# Patient Record
Sex: FEMALE | Race: WHITE | Employment: FULL TIME | URBAN - METROPOLITAN AREA
[De-identification: names, ages, dates, MRNs, and addresses within clinical notes are randomized per-mention and may not be internally consistent; named-entity substitution may affect disease eponyms.]

---

## 2017-01-10 ENCOUNTER — LAB REQUISITION (OUTPATIENT)
Dept: LAB | Facility: HOSPITAL | Age: 22
End: 2017-01-10
Payer: COMMERCIAL

## 2017-01-10 DIAGNOSIS — F33.9 RECURRENT MAJOR DEPRESSIVE DISORDER (HCC): ICD-10-CM

## 2017-01-10 LAB
B-HCG UR QL: NEGATIVE
BILIRUB UR QL: NEGATIVE
CLARITY UR: CLEAR
COLOR UR: YELLOW
GLUCOSE UR-MCNC: NEGATIVE MG/DL
HGB UR QL STRIP.AUTO: NEGATIVE
KETONES UR-MCNC: NEGATIVE MG/DL
NITRITE UR QL STRIP.AUTO: NEGATIVE
PH UR: 7 [PH] (ref 5–8)
PROT UR-MCNC: NEGATIVE MG/DL
RBC #/AREA URNS AUTO: <1 /HPF
SP GR UR STRIP: 1.01 (ref 1–1.03)
UROBILINOGEN UR STRIP-ACNC: <2
VIT C UR-MCNC: NEGATIVE MG/DL
WBC #/AREA URNS AUTO: 0 /HPF

## 2017-01-10 PROCEDURE — 81001 URINALYSIS AUTO W/SCOPE: CPT | Performed by: OTHER

## 2017-01-10 PROCEDURE — 81025 URINE PREGNANCY TEST: CPT | Performed by: OTHER

## 2017-01-25 ENCOUNTER — LAB REQUISITION (OUTPATIENT)
Dept: ADMINISTRATIVE | Age: 22
End: 2017-01-25
Payer: COMMERCIAL

## 2017-01-25 DIAGNOSIS — F50.9 EATING DISORDER, UNSPECIFIED: ICD-10-CM

## 2017-01-25 LAB
ALBUMIN SERPL BCP-MCNC: 3.9 G/DL (ref 3.5–4.8)
ALBUMIN/GLOB SERPL: 1.2 {RATIO} (ref 1–2)
ALP SERPL-CCNC: 70 U/L (ref 32–100)
ALT SERPL-CCNC: 18 U/L (ref 14–54)
AMYLASE SERPL-CCNC: 84 U/L (ref 24–108)
ANION GAP SERPL CALC-SCNC: 6 MMOL/L (ref 0–18)
AST SERPL-CCNC: 23 U/L (ref 15–41)
BASOPHILS # BLD: 0.1 K/UL (ref 0–0.2)
BASOPHILS NFR BLD: 1 %
BILIRUB SERPL-MCNC: 0.7 MG/DL (ref 0.3–1.2)
BUN SERPL-MCNC: 11 MG/DL (ref 8–20)
BUN/CREAT SERPL: 12.5 (ref 10–20)
CALCIUM SERPL-MCNC: 9.3 MG/DL (ref 8.5–10.5)
CHLORIDE SERPL-SCNC: 106 MMOL/L (ref 95–110)
CHOLEST SERPL-MCNC: 149 MG/DL (ref 110–200)
CO2 SERPL-SCNC: 28 MMOL/L (ref 22–32)
CREAT SERPL-MCNC: 0.88 MG/DL (ref 0.5–1.5)
EOSINOPHIL # BLD: 0.1 K/UL (ref 0–0.7)
EOSINOPHIL NFR BLD: 2 %
ERYTHROCYTE [DISTWIDTH] IN BLOOD BY AUTOMATED COUNT: 13.8 % (ref 11–15)
GLOBULIN PLAS-MCNC: 3.2 G/DL (ref 2.5–3.7)
GLUCOSE SERPL-MCNC: 98 MG/DL (ref 70–99)
HCT VFR BLD AUTO: 41.9 % (ref 35–48)
HDLC SERPL-MCNC: 72 MG/DL
HGB BLD-MCNC: 13.9 G/DL (ref 12–16)
LDLC SERPL CALC-MCNC: 68 MG/DL (ref 0–99)
LYMPHOCYTES # BLD: 1.9 K/UL (ref 1–4)
LYMPHOCYTES NFR BLD: 31 %
MAGNESIUM SERPL-MCNC: 2.1 MG/DL (ref 1.8–2.5)
MCH RBC QN AUTO: 31.4 PG (ref 27–32)
MCHC RBC AUTO-ENTMCNC: 33.2 G/DL (ref 32–37)
MCV RBC AUTO: 94.7 FL (ref 80–100)
MONOCYTES # BLD: 0.6 K/UL (ref 0–1)
MONOCYTES NFR BLD: 9 %
NEUTROPHILS # BLD AUTO: 3.4 K/UL (ref 1.8–7.7)
NEUTROPHILS NFR BLD: 57 %
NONHDLC SERPL-MCNC: 77 MG/DL
OSMOLALITY UR CALC.SUM OF ELEC: 289 MOSM/KG (ref 275–295)
PHOSPHATE SERPL-MCNC: 4.2 MG/DL (ref 2.4–4.7)
PLATELET # BLD AUTO: 246 K/UL (ref 140–400)
PMV BLD AUTO: 9.3 FL (ref 7.4–10.3)
POTASSIUM SERPL-SCNC: 4.3 MMOL/L (ref 3.3–5.1)
PROT SERPL-MCNC: 7.1 G/DL (ref 5.9–8.4)
RBC # BLD AUTO: 4.42 M/UL (ref 3.7–5.4)
SODIUM SERPL-SCNC: 140 MMOL/L (ref 136–144)
TRIGL SERPL-MCNC: 44 MG/DL (ref 1–149)
TSH SERPL-ACNC: 1.27 UIU/ML (ref 0.34–5.6)
WBC # BLD AUTO: 6.1 K/UL (ref 4–11)

## 2017-01-25 PROCEDURE — 36415 COLL VENOUS BLD VENIPUNCTURE: CPT | Performed by: OTHER

## 2017-01-25 PROCEDURE — 84100 ASSAY OF PHOSPHORUS: CPT | Performed by: OTHER

## 2017-01-25 PROCEDURE — 83735 ASSAY OF MAGNESIUM: CPT | Performed by: OTHER

## 2017-01-25 PROCEDURE — 82150 ASSAY OF AMYLASE: CPT | Performed by: OTHER

## 2017-01-25 PROCEDURE — 80061 LIPID PANEL: CPT | Performed by: OTHER

## 2017-01-25 PROCEDURE — 84443 ASSAY THYROID STIM HORMONE: CPT | Performed by: OTHER

## 2017-01-25 PROCEDURE — 85025 COMPLETE CBC W/AUTO DIFF WBC: CPT | Performed by: OTHER

## 2017-01-25 PROCEDURE — 80053 COMPREHEN METABOLIC PANEL: CPT | Performed by: OTHER

## 2017-01-26 ENCOUNTER — LAB REQUISITION (OUTPATIENT)
Dept: ADMINISTRATIVE | Age: 22
End: 2017-01-26
Payer: COMMERCIAL

## 2017-01-26 DIAGNOSIS — F50.9 EATING DISORDER, UNSPECIFIED: ICD-10-CM

## 2017-01-27 ENCOUNTER — LAB REQUISITION (OUTPATIENT)
Dept: ADMINISTRATIVE | Age: 22
End: 2017-01-27
Payer: COMMERCIAL

## 2017-01-27 DIAGNOSIS — F50.9 EATING DISORDER, UNSPECIFIED: ICD-10-CM

## 2017-01-27 LAB
AMYLASE SERPL-CCNC: 75 U/L (ref 24–108)
ANION GAP SERPL CALC-SCNC: 12 MMOL/L (ref 0–18)
BUN SERPL-MCNC: 10 MG/DL (ref 8–20)
BUN/CREAT SERPL: 12.7 (ref 10–20)
CALCIUM SERPL-MCNC: 9 MG/DL (ref 8.5–10.5)
CHLORIDE SERPL-SCNC: 105 MMOL/L (ref 95–110)
CO2 SERPL-SCNC: 23 MMOL/L (ref 22–32)
CREAT SERPL-MCNC: 0.79 MG/DL (ref 0.5–1.5)
GLUCOSE SERPL-MCNC: 98 MG/DL (ref 70–99)
MAGNESIUM SERPL-MCNC: 2.2 MG/DL (ref 1.8–2.5)
OSMOLALITY UR CALC.SUM OF ELEC: 289 MOSM/KG (ref 275–295)
PHOSPHATE SERPL-MCNC: 4.2 MG/DL (ref 2.4–4.7)
POTASSIUM SERPL-SCNC: 3.9 MMOL/L (ref 3.3–5.1)
SODIUM SERPL-SCNC: 140 MMOL/L (ref 136–144)

## 2017-01-27 PROCEDURE — 80048 BASIC METABOLIC PNL TOTAL CA: CPT | Performed by: OTHER

## 2017-01-27 PROCEDURE — 84100 ASSAY OF PHOSPHORUS: CPT | Performed by: OTHER

## 2017-01-27 PROCEDURE — 82150 ASSAY OF AMYLASE: CPT | Performed by: OTHER

## 2017-01-27 PROCEDURE — 36415 COLL VENOUS BLD VENIPUNCTURE: CPT | Performed by: OTHER

## 2017-01-27 PROCEDURE — 83735 ASSAY OF MAGNESIUM: CPT | Performed by: OTHER

## 2017-01-30 LAB
AMYLASE SERPL-CCNC: 73 U/L (ref 24–108)
ANION GAP SERPL CALC-SCNC: 8 MMOL/L (ref 0–18)
BUN SERPL-MCNC: 7 MG/DL (ref 8–20)
BUN/CREAT SERPL: 8.5 (ref 10–20)
CALCIUM SERPL-MCNC: 8.8 MG/DL (ref 8.5–10.5)
CHLORIDE SERPL-SCNC: 108 MMOL/L (ref 95–110)
CO2 SERPL-SCNC: 25 MMOL/L (ref 22–32)
CREAT SERPL-MCNC: 0.82 MG/DL (ref 0.5–1.5)
GLUCOSE SERPL-MCNC: 91 MG/DL (ref 70–99)
MAGNESIUM SERPL-MCNC: 2 MG/DL (ref 1.8–2.5)
OSMOLALITY UR CALC.SUM OF ELEC: 290 MOSM/KG (ref 275–295)
PHOSPHATE SERPL-MCNC: 3.7 MG/DL (ref 2.4–4.7)
POTASSIUM SERPL-SCNC: 3.8 MMOL/L (ref 3.3–5.1)
SODIUM SERPL-SCNC: 141 MMOL/L (ref 136–144)

## 2017-01-30 PROCEDURE — 83735 ASSAY OF MAGNESIUM: CPT | Performed by: OTHER

## 2017-01-30 PROCEDURE — 36415 COLL VENOUS BLD VENIPUNCTURE: CPT | Performed by: OTHER

## 2017-01-30 PROCEDURE — 80048 BASIC METABOLIC PNL TOTAL CA: CPT | Performed by: OTHER

## 2017-01-30 PROCEDURE — 84100 ASSAY OF PHOSPHORUS: CPT | Performed by: OTHER

## 2017-01-30 PROCEDURE — 82150 ASSAY OF AMYLASE: CPT | Performed by: OTHER

## 2017-02-05 NOTE — BH PROGRESS NOTE
This writer sat with Pt to call [facility] to get eligibility and benefits, Judd Guzman is closed till 2/6/17. Notified RN and [facility] and rn will be notified of POC once AOC at [facility] is reached.

## 2017-02-05 NOTE — BH PROGRESS NOTE
[facility] to [facility] Transfer request to be made to [facility] as they are in network with pt's insurance. Awaiting call back from [facility] on request. Andrew Gutierrez RN updated on status.

## 2017-02-05 NOTE — ED NOTES
Received call from [facility] at [facility]. Possible transfer to [facility]idence Vicente Hendricks. Pt updated. Denies food tray at this time. Poly Harkins PCT sitter for pt.

## 2017-02-05 NOTE — BH PROGRESS NOTE
Pt has been accepted at [facility]. Awaiting c/b from [facility] on what time pt can be transferred. Will keep ER updated once [facility] updates [facility].

## 2017-02-05 NOTE — ED PROVIDER NOTES
Patient Seen in: [facility] Emergency Department    History   Patient presents with:  Eval-P (psychiatric)    Stated Complaint: si w a plan   arrived from [facility] with petition completed    HPI    17-year-old female sent over from [facility] Mother    • Hypertension Mother    • Suicide History Mother    • Diabetes Maternal Grandmother    • Obesity Maternal Grandmother    • Depression Maternal Grandmother    • OCD Maternal Grandmother    • Cancer Maternal Grandfather    • Alcohol and Other Diso distension. There is no tenderness. Musculoskeletal: Normal range of motion. She exhibits no tenderness. Neurological: She is alert and oriented to person, place, and time. She exhibits normal muscle tone.  Coordination normal.   Skin: Skin is warm and

## 2017-02-05 NOTE — ED NOTES
Pt ambulated to bathroom by [facility] RN. Food tray delivered to pt.    Fresh blankets provided

## 2017-02-05 NOTE — ED NOTES
Round on pt. Pt remains calm and cooperative with staff. Updated on poc. Pt accepts request to order food tray.
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
## 2017-02-05 NOTE — PROGRESS NOTES
[employer] reported pt does not have insurance. Nieves Huntley called and screener is coming out to screen pt for Nieves Huntley.

## 2017-02-05 NOTE — PROGRESS NOTES
Talked to Alan Valdovinos RN, in the ED and informed him that a [place] screener will be out to screen pt.

## 2017-02-05 NOTE — ED NOTES
Report from [facility]. Per Sendy Molina RN, pt has 2 personal belongings at [employer]. [employer] rep in room assessing pt.

## 2017-02-05 NOTE — ED INITIAL ASSESSMENT (HPI)
Pt arrived via ems/ed bls from [facility]; Needs cert and medical clearance. si with a plan. Pt remains calm and cooperative.

## 2017-02-05 NOTE — ED NOTES
Pt awakens easily - awaiting urine specimen. Call from [address] [place] [address] Street states he will try placement after med cleared.

## 2017-02-05 NOTE — ED NOTES
Received a call from Terri Ty at [facility]. Report given. States will call back with admitting doctor.

## 2017-02-05 NOTE — ED NOTES
Round on pt. Updated on plan for transfer. No distress noted. Pt denies any needs. Offered food tray.

## 2017-02-05 NOTE — BH PROGRESS NOTE
0900: Notified the RN that Pt does have ins and needs to call the ins company to get OON benefits.    Pt signed CAMI for mother, Christa Holliday [phone]

## 2017-02-05 NOTE — ED NOTES
Hailey Clay [facility]) says Zuly Thomson will be out to evaluate pt in approx the next 2 hours.

## 2017-02-05 NOTE — ED NOTES
Patient is resting comfortably. Pt has been notified of urine spec orders and instructed to call for assist to bathroom.

## 2017-02-05 NOTE — ED NOTES
Received call from Roxana at [facility]. [facility] in Residence [facility].  Call [phone] request to speak to [facility]

## 2017-02-05 NOTE — PROGRESS NOTES
Faxed information to [address], [address] [facility], and [facility]. Awaiting answers.

## 2017-02-05 NOTE — PROGRESS NOTES
Accepted at [facility].  RN to RN #: [phone] Guadalupe Lux)

## 2017-02-05 NOTE — ED NOTES
Round on pt. Pt sleeping but easily arouseable. No distress noted. Pt denies any needs. Denies lunch tray.

## 2017-02-06 NOTE — ED NOTES
Ambulance arrived to  patient in A0. SHARMIN Marroquin filled out a form, while SHARMIN Park (taking different pt to floor) gave a verbal report over the phone. Pt was given a phone to call her mother before leaving with ambulance.  Also, printed out a triage s

## 2017-09-27 PROBLEM — F43.10 PTSD (POST-TRAUMATIC STRESS DISORDER): Status: ACTIVE | Noted: 2017-09-27

## 2018-04-30 ENCOUNTER — APPOINTMENT (OUTPATIENT)
Dept: LAB | Age: 23
End: 2018-04-30
Attending: NURSE PRACTITIONER
Payer: COMMERCIAL

## 2018-04-30 DIAGNOSIS — Z51.81 MEDICATION MONITORING ENCOUNTER: ICD-10-CM

## 2018-04-30 PROCEDURE — 93010 ELECTROCARDIOGRAM REPORT: CPT | Performed by: NURSE PRACTITIONER

## 2018-04-30 PROCEDURE — 93005 ELECTROCARDIOGRAM TRACING: CPT

## 2018-05-02 ENCOUNTER — OFFICE VISIT (OUTPATIENT)
Dept: OBGYN CLINIC | Facility: CLINIC | Age: 23
End: 2018-05-02

## 2018-05-02 VITALS
WEIGHT: 159 LBS | RESPIRATION RATE: 16 BRPM | HEIGHT: 69 IN | SYSTOLIC BLOOD PRESSURE: 110 MMHG | BODY MASS INDEX: 23.55 KG/M2 | DIASTOLIC BLOOD PRESSURE: 60 MMHG | HEART RATE: 92 BPM

## 2018-05-02 DIAGNOSIS — Z01.419 ENCOUNTER FOR WELL WOMAN EXAM WITH ROUTINE GYNECOLOGICAL EXAM: Primary | ICD-10-CM

## 2018-05-02 DIAGNOSIS — Z12.4 CERVICAL CANCER SCREENING: ICD-10-CM

## 2018-05-02 PROCEDURE — 99385 PREV VISIT NEW AGE 18-39: CPT | Performed by: OBSTETRICS & GYNECOLOGY

## 2018-05-02 PROCEDURE — 88175 CYTOPATH C/V AUTO FLUID REDO: CPT | Performed by: OBSTETRICS & GYNECOLOGY

## 2018-05-02 NOTE — PROGRESS NOTES
José Norman is a 23year old female  Patient's last menstrual period was 2018 (approximate). Patient presents with:  Wellness Visit: annual. new pt. discuss BC options.    .  Patient is virgin, presents for annual  Patient c/o irregular me Grandmother    • Cancer Maternal Grandfather    • Alcohol and Other Disorders Associated Brother    • Anxiety Brother    • Depression Brother    • Substance Abuse Brother    • ADHD Brother    • Suicide History Brother    • Obesity Other    • Alcohol and Ot Constitutional: well developed, well nourished  Head/Face: normocephalic  Neck/Thyroid: thyroid symmetric, no thyromegaly, no nodules, no adenopathy  Lymphatic:no abnormal supraclavicular or axillary adenopathy is noted  Breast: normal without palpable m

## 2018-05-08 ENCOUNTER — OFFICE VISIT (OUTPATIENT)
Dept: OBGYN CLINIC | Facility: CLINIC | Age: 23
End: 2018-05-08

## 2018-05-08 VITALS
RESPIRATION RATE: 16 BRPM | HEIGHT: 69 IN | BODY MASS INDEX: 23.55 KG/M2 | HEART RATE: 92 BPM | SYSTOLIC BLOOD PRESSURE: 120 MMHG | DIASTOLIC BLOOD PRESSURE: 60 MMHG | WEIGHT: 159 LBS | TEMPERATURE: 98 F

## 2018-05-08 DIAGNOSIS — Z30.46 ENCOUNTER FOR REMOVAL OF SUBDERMAL CONTRACEPTIVE IMPLANT: Primary | ICD-10-CM

## 2018-05-08 PROCEDURE — 11982 REMOVE DRUG IMPLANT DEVICE: CPT | Performed by: OBSTETRICS & GYNECOLOGY

## 2018-05-08 RX ORDER — NORETHINDRONE ACETATE AND ETHINYL ESTRADIOL 1MG-20(21)
1 KIT ORAL DAILY
Qty: 1 PACKAGE | Refills: 11 | Status: SHIPPED | OUTPATIENT
Start: 2018-05-08 | End: 2018-06-19

## 2018-05-08 RX ORDER — MEDROXYPROGESTERONE ACETATE 10 MG/1
10 TABLET ORAL DAILY
Qty: 10 TABLET | Refills: 0 | Status: SHIPPED | OUTPATIENT
Start: 2018-05-08 | End: 2018-06-19 | Stop reason: ALTCHOICE

## 2018-05-08 NOTE — PROGRESS NOTES
Nexplanon Removal    Birth control method(s) used: Nexplanon  Consent was obtained from the patient.   Indication:patients request    Removal:  2 % lidocaine with Epinephrine was injected underneath the tip of the Nexplanon frank that is closest to the left e

## 2018-05-17 ENCOUNTER — TELEPHONE (OUTPATIENT)
Dept: OBGYN CLINIC | Facility: CLINIC | Age: 23
End: 2018-05-17

## 2018-05-17 RX ORDER — MEDROXYPROGESTERONE ACETATE 10 MG/1
10 TABLET ORAL DAILY
Qty: 10 TABLET | Refills: 0 | OUTPATIENT
Start: 2018-05-17

## 2018-05-25 ENCOUNTER — TELEPHONE (OUTPATIENT)
Dept: OBGYN CLINIC | Facility: CLINIC | Age: 23
End: 2018-05-25

## 2018-05-25 RX ORDER — MEDROXYPROGESTERONE ACETATE 10 MG/1
10 TABLET ORAL DAILY
Qty: 10 TABLET | Refills: 0 | OUTPATIENT
Start: 2018-05-25

## 2018-06-19 ENCOUNTER — HOSPITAL ENCOUNTER (OUTPATIENT)
Dept: CT IMAGING | Age: 23
Discharge: HOME | End: 2018-06-19
Attending: FAMILY MEDICINE
Payer: COMMERCIAL

## 2018-06-19 ENCOUNTER — HOSPITAL ENCOUNTER (OUTPATIENT)
Dept: CT IMAGING | Age: 23
End: 2018-06-19
Attending: FAMILY MEDICINE
Payer: COMMERCIAL

## 2018-06-19 DIAGNOSIS — G44.059 HEADACHE , SHORT UNILAT NEURALGIFORM, W/CONJUNCTIVAL INJECTION/TEARING: ICD-10-CM

## 2018-06-19 DIAGNOSIS — R51.9 HEAD ACHE: ICD-10-CM

## 2018-06-19 PROCEDURE — 70460 CT HEAD/BRAIN W/DYE: CPT | Performed by: FAMILY MEDICINE

## 2018-06-19 PROCEDURE — 82565 ASSAY OF CREATININE: CPT

## 2018-07-31 ENCOUNTER — APPOINTMENT (OUTPATIENT)
Dept: LAB | Age: 23
End: 2018-07-31
Attending: NURSE PRACTITIONER
Payer: COMMERCIAL

## 2018-07-31 DIAGNOSIS — Z51.81 MEDICATION MONITORING ENCOUNTER: ICD-10-CM

## 2018-07-31 LAB
ATRIAL RATE: 78 BPM
P AXIS: 34 DEGREES
P-R INTERVAL: 136 MS
Q-T INTERVAL: 376 MS
QRS DURATION: 80 MS
QTC CALCULATION (BEZET): 428 MS
R AXIS: 68 DEGREES
T AXIS: 15 DEGREES
VENTRICULAR RATE: 78 BPM

## 2018-07-31 PROCEDURE — 93005 ELECTROCARDIOGRAM TRACING: CPT

## 2018-07-31 PROCEDURE — 93010 ELECTROCARDIOGRAM REPORT: CPT | Performed by: INTERNAL MEDICINE

## 2018-09-30 PROBLEM — F33.2 SEVERE RECURRENT MAJOR DEPRESSION (HCC): Status: ACTIVE | Noted: 2018-09-30

## 2019-06-13 ENCOUNTER — HOSPITAL ENCOUNTER (OUTPATIENT)
Dept: CV DIAGNOSTICS | Age: 24
Discharge: HOME | End: 2019-06-13
Attending: FAMILY MEDICINE
Payer: COMMERCIAL

## 2019-06-13 DIAGNOSIS — R00.2 PALPITATIONS: ICD-10-CM

## 2019-06-13 PROCEDURE — 93227 XTRNL ECG REC<48 HR R&I: CPT | Performed by: FAMILY MEDICINE

## 2019-06-13 PROCEDURE — 93225 XTRNL ECG REC<48 HRS REC: CPT | Performed by: FAMILY MEDICINE

## 2021-01-17 ENCOUNTER — HOSPITAL ENCOUNTER (EMERGENCY)
Age: 26
Discharge: HOME | End: 2021-01-17
Attending: EMERGENCY MEDICINE
Payer: COMMERCIAL

## 2021-01-17 ENCOUNTER — APPOINTMENT (OUTPATIENT)
Dept: CT IMAGING | Age: 26
End: 2021-01-17
Attending: EMERGENCY MEDICINE
Payer: COMMERCIAL

## 2021-01-17 ENCOUNTER — APPOINTMENT (OUTPATIENT)
Dept: GENERAL RADIOLOGY | Age: 26
End: 2021-01-17
Attending: EMERGENCY MEDICINE
Payer: COMMERCIAL

## 2021-01-17 VITALS
HEART RATE: 78 BPM | HEIGHT: 69 IN | BODY MASS INDEX: 28.73 KG/M2 | OXYGEN SATURATION: 96 % | SYSTOLIC BLOOD PRESSURE: 109 MMHG | WEIGHT: 194 LBS | RESPIRATION RATE: 16 BRPM | TEMPERATURE: 98 F | DIASTOLIC BLOOD PRESSURE: 57 MMHG

## 2021-01-17 DIAGNOSIS — R06.00 DYSPNEA, UNSPECIFIED TYPE: Primary | ICD-10-CM

## 2021-01-17 LAB
ANION GAP SERPL CALC-SCNC: 6 MMOL/L (ref 0–18)
BUN BLD-MCNC: 7 MG/DL (ref 7–18)
BUN/CREAT SERPL: 6.9 (ref 10–20)
CALCIUM BLD-MCNC: 8.6 MG/DL (ref 8.5–10.1)
CHLORIDE SERPL-SCNC: 108 MMOL/L (ref 98–112)
CO2 SERPL-SCNC: 26 MMOL/L (ref 21–32)
CREAT BLD-MCNC: 1.02 MG/DL
D-DIMER: 0.9 UG/ML FEU (ref ?–0.5)
GLUCOSE BLD-MCNC: 98 MG/DL (ref 70–99)
OSMOLALITY SERPL CALC.SUM OF ELEC: 288 MOSM/KG (ref 275–295)
POTASSIUM SERPL-SCNC: 4.2 MMOL/L (ref 3.5–5.1)
SODIUM SERPL-SCNC: 140 MMOL/L (ref 136–145)
TROPONIN I SERPL-MCNC: <0.045 NG/ML (ref ?–0.04)

## 2021-01-17 PROCEDURE — 99285 EMERGENCY DEPT VISIT HI MDM: CPT

## 2021-01-17 PROCEDURE — 80048 BASIC METABOLIC PNL TOTAL CA: CPT | Performed by: EMERGENCY MEDICINE

## 2021-01-17 PROCEDURE — 36415 COLL VENOUS BLD VENIPUNCTURE: CPT

## 2021-01-17 PROCEDURE — 85379 FIBRIN DEGRADATION QUANT: CPT | Performed by: EMERGENCY MEDICINE

## 2021-01-17 PROCEDURE — 93005 ELECTROCARDIOGRAM TRACING: CPT

## 2021-01-17 PROCEDURE — 71045 X-RAY EXAM CHEST 1 VIEW: CPT | Performed by: EMERGENCY MEDICINE

## 2021-01-17 PROCEDURE — 93010 ELECTROCARDIOGRAM REPORT: CPT

## 2021-01-17 PROCEDURE — 71275 CT ANGIOGRAPHY CHEST: CPT | Performed by: EMERGENCY MEDICINE

## 2021-01-17 PROCEDURE — 99284 EMERGENCY DEPT VISIT MOD MDM: CPT

## 2021-01-17 PROCEDURE — 84484 ASSAY OF TROPONIN QUANT: CPT | Performed by: EMERGENCY MEDICINE

## 2021-01-17 NOTE — ED PROVIDER NOTES
Patient Seen in: THE [facility] Emergency Department In [place]      History   Patient presents with:  Sore Throat    Stated Complaint: c/o SOB this am, states he thinks its his voice changing due to testosterone me*    HPI/Subjective:   HPI    24-year-old fe ED Triage Vitals [01/17/21 0924]   /85   Pulse 90   Resp 18   Temp 98 °F (36.7 °C)   Temp src Temporal   SpO2 97 %   O2 Device None (Room air)       Current:/57   Pulse 78   Temp 98 °F (36.7 °C) (Temporal)   Resp 16   Ht 175.3 cm (5' 9\") slightly elevated. Chest x-ray negative's for acute finding. CTA negative for evidence of PE. Patient's work-up today was negative for evidence of acute coronary ischemia, negative for acute pathology on chest x-ray and CTA.   Patient will be discharg

## 2021-01-18 LAB
ATRIAL RATE: 93 BPM
P AXIS: 50 DEGREES
P-R INTERVAL: 134 MS
Q-T INTERVAL: 348 MS
QRS DURATION: 76 MS
QTC CALCULATION (BEZET): 432 MS
R AXIS: 70 DEGREES
T AXIS: 24 DEGREES
VENTRICULAR RATE: 93 BPM

## 2021-01-20 LAB — AMB EXT COVID-19 RESULT: NOT DETECTED

## 2021-01-27 ENCOUNTER — HOSPITAL ENCOUNTER (EMERGENCY)
Age: 26
Discharge: HOME | End: 2021-01-27
Attending: EMERGENCY MEDICINE
Payer: COMMERCIAL

## 2021-01-27 VITALS
SYSTOLIC BLOOD PRESSURE: 100 MMHG | WEIGHT: 197 LBS | HEART RATE: 88 BPM | DIASTOLIC BLOOD PRESSURE: 52 MMHG | HEIGHT: 70 IN | TEMPERATURE: 99 F | BODY MASS INDEX: 28.2 KG/M2 | OXYGEN SATURATION: 96 % | RESPIRATION RATE: 18 BRPM

## 2021-01-27 DIAGNOSIS — R19.7 DIARRHEA, UNSPECIFIED TYPE: Primary | ICD-10-CM

## 2021-01-27 LAB
ALBUMIN SERPL-MCNC: 3.3 G/DL (ref 3.4–5)
ALBUMIN/GLOB SERPL: 0.9 {RATIO} (ref 1–2)
ALP LIVER SERPL-CCNC: 88 U/L
ALT SERPL-CCNC: 121 U/L
ANION GAP SERPL CALC-SCNC: 6 MMOL/L (ref 0–18)
AST SERPL-CCNC: 35 U/L (ref 15–37)
BASOPHILS # BLD AUTO: 0.07 X10(3) UL (ref 0–0.2)
BASOPHILS NFR BLD AUTO: 0.6 %
BILIRUB SERPL-MCNC: 0.6 MG/DL (ref 0.1–2)
BUN BLD-MCNC: 12 MG/DL (ref 7–18)
BUN/CREAT SERPL: 10.2 (ref 10–20)
CALCIUM BLD-MCNC: 8.5 MG/DL (ref 8.5–10.1)
CHLORIDE SERPL-SCNC: 106 MMOL/L (ref 98–112)
CO2 SERPL-SCNC: 26 MMOL/L (ref 21–32)
CREAT BLD-MCNC: 1.18 MG/DL
DEPRECATED RDW RBC AUTO: 43.2 FL (ref 35.1–46.3)
EOSINOPHIL # BLD AUTO: 0.13 X10(3) UL (ref 0–0.7)
EOSINOPHIL NFR BLD AUTO: 1.2 %
ERYTHROCYTE [DISTWIDTH] IN BLOOD BY AUTOMATED COUNT: 12.8 % (ref 11–15)
GLOBULIN PLAS-MCNC: 3.7 G/DL (ref 2.8–4.4)
GLUCOSE BLD-MCNC: 113 MG/DL (ref 70–99)
HCT VFR BLD AUTO: 44.9 %
HGB BLD-MCNC: 14.8 G/DL
IMM GRANULOCYTES # BLD AUTO: 0.05 X10(3) UL (ref 0–1)
IMM GRANULOCYTES NFR BLD: 0.5 %
LIPASE SERPL-CCNC: 89 U/L (ref 73–393)
LYMPHOCYTES # BLD AUTO: 2.16 X10(3) UL (ref 1–4)
LYMPHOCYTES NFR BLD AUTO: 19.5 %
M PROTEIN MFR SERPL ELPH: 7 G/DL (ref 6.4–8.2)
MCH RBC QN AUTO: 30.3 PG (ref 26–34)
MCHC RBC AUTO-ENTMCNC: 33 G/DL (ref 31–37)
MCV RBC AUTO: 91.8 FL
MONOCYTES # BLD AUTO: 0.82 X10(3) UL (ref 0.1–1)
MONOCYTES NFR BLD AUTO: 7.4 %
NEUTROPHILS # BLD AUTO: 7.85 X10 (3) UL (ref 1.5–7.7)
NEUTROPHILS # BLD AUTO: 7.85 X10(3) UL (ref 1.5–7.7)
NEUTROPHILS NFR BLD AUTO: 70.8 %
OSMOLALITY SERPL CALC.SUM OF ELEC: 287 MOSM/KG (ref 275–295)
PLATELET # BLD AUTO: 292 10(3)UL (ref 150–450)
POTASSIUM SERPL-SCNC: 4.6 MMOL/L (ref 3.5–5.1)
RBC # BLD AUTO: 4.89 X10(6)UL
SODIUM SERPL-SCNC: 138 MMOL/L (ref 136–145)
WBC # BLD AUTO: 11.1 X10(3) UL (ref 4–11)

## 2021-01-27 PROCEDURE — 85025 COMPLETE CBC W/AUTO DIFF WBC: CPT | Performed by: EMERGENCY MEDICINE

## 2021-01-27 PROCEDURE — 80053 COMPREHEN METABOLIC PANEL: CPT | Performed by: EMERGENCY MEDICINE

## 2021-01-27 PROCEDURE — 99284 EMERGENCY DEPT VISIT MOD MDM: CPT

## 2021-01-27 PROCEDURE — 96360 HYDRATION IV INFUSION INIT: CPT

## 2021-01-27 PROCEDURE — 83690 ASSAY OF LIPASE: CPT | Performed by: PHYSICIAN ASSISTANT

## 2021-01-27 RX ORDER — DICYCLOMINE HCL 20 MG
20 TABLET ORAL 4 TIMES DAILY PRN
Qty: 30 TABLET | Refills: 0 | Status: SHIPPED | OUTPATIENT
Start: 2021-01-27 | End: 2021-02-26

## 2021-01-27 NOTE — ED INITIAL ASSESSMENT (HPI)
Pt noticed a tapeworm in stool 2 days ago and since has had lower abd pain and diarrhea. Fevers, no vomiting.

## 2021-01-27 NOTE — ED PROVIDER NOTES
Patient Seen in: [facility] Emergency Department In [place]      History   Patient presents with:  Abdomen/Flank Pain    Stated Complaint: Abd pain, abn stool    HPI/Subjective:   HPI    This is a 67-year-old female. Currently transitioning to male.   Med Ht 177.8 cm (5' 10\")   Wt 89.4 kg   LMP 01/10/2021   SpO2 96%   BMI 28.27 kg/m²         Physical Exam    Gen: Well appearing, well groomed, alert and aware x 3  Neck: Supple, full range of motion  Eye examination: EOMs are intact, normal conjunctival  EN abnormalities. Unable to provide a stool sample. Order switched to outpatient. Monitor for any acute fever, pain, blood in stool or other evolution. Complete stool study as soon as possible. Bentyl provided. Push clear fluids.                  Dis

## 2021-01-29 ENCOUNTER — LAB ENCOUNTER (OUTPATIENT)
Dept: LAB | Age: 26
End: 2021-01-29
Attending: PHYSICIAN ASSISTANT
Payer: COMMERCIAL

## 2021-01-29 DIAGNOSIS — R19.7 DIARRHEA, UNSPECIFIED TYPE: ICD-10-CM

## 2021-01-29 PROCEDURE — 87427 SHIGA-LIKE TOXIN AG IA: CPT

## 2021-01-29 PROCEDURE — 87046 STOOL CULTR AEROBIC BACT EA: CPT

## 2021-01-29 PROCEDURE — 87045 FECES CULTURE AEROBIC BACT: CPT

## 2021-01-29 PROCEDURE — 82272 OCCULT BLD FECES 1-3 TESTS: CPT

## 2021-01-29 PROCEDURE — 87209 SMEAR COMPLEX STAIN: CPT

## 2021-01-29 PROCEDURE — 87177 OVA AND PARASITES SMEARS: CPT

## 2021-02-02 LAB — OVA AND PARASITE, FECAL INTERPRETATION: NEGATIVE

## 2021-02-04 ENCOUNTER — HOSPITAL ENCOUNTER (OUTPATIENT)
Dept: GENERAL RADIOLOGY | Age: 26
Discharge: HOME | End: 2021-02-04
Attending: NURSE PRACTITIONER
Payer: COMMERCIAL

## 2021-02-04 DIAGNOSIS — R07.9 CHEST PAIN, UNSPECIFIED: ICD-10-CM

## 2021-02-04 PROCEDURE — 71046 X-RAY EXAM CHEST 2 VIEWS: CPT | Performed by: NURSE PRACTITIONER

## 2021-02-16 ENCOUNTER — HOSPITAL ENCOUNTER (EMERGENCY)
Age: 26
Discharge: HOME | End: 2021-02-16
Attending: EMERGENCY MEDICINE
Payer: COMMERCIAL

## 2021-02-16 ENCOUNTER — APPOINTMENT (OUTPATIENT)
Dept: GENERAL RADIOLOGY | Age: 26
End: 2021-02-16
Attending: EMERGENCY MEDICINE
Payer: COMMERCIAL

## 2021-02-16 VITALS
BODY MASS INDEX: 28.63 KG/M2 | DIASTOLIC BLOOD PRESSURE: 66 MMHG | OXYGEN SATURATION: 97 % | HEIGHT: 70 IN | SYSTOLIC BLOOD PRESSURE: 113 MMHG | HEART RATE: 105 BPM | RESPIRATION RATE: 16 BRPM | TEMPERATURE: 98 F | WEIGHT: 200 LBS

## 2021-02-16 DIAGNOSIS — M54.2 NECK PAIN: Primary | ICD-10-CM

## 2021-02-16 PROCEDURE — 99283 EMERGENCY DEPT VISIT LOW MDM: CPT

## 2021-02-16 PROCEDURE — 72050 X-RAY EXAM NECK SPINE 4/5VWS: CPT | Performed by: EMERGENCY MEDICINE

## 2021-02-16 RX ORDER — OMEPRAZOLE 40 MG/1
1 CAPSULE, DELAYED RELEASE ORAL DAILY
COMMUNITY
Start: 2021-01-30

## 2021-02-16 RX ORDER — ALBUTEROL SULFATE 90 UG/1
1-2 AEROSOL, METERED RESPIRATORY (INHALATION) EVERY 4 HOURS PRN
Status: ON HOLD | COMMUNITY
Start: 2021-01-21 | End: 2021-05-31

## 2021-02-16 RX ORDER — SUMATRIPTAN 50 MG/1
50 TABLET, FILM COATED ORAL EVERY 2 HOUR PRN
COMMUNITY
Start: 2021-01-30 | End: 2021-10-04

## 2021-02-16 RX ORDER — TOPIRAMATE 50 MG/1
50 TABLET, FILM COATED ORAL DAILY
Status: ON HOLD | COMMUNITY
Start: 2021-01-30 | End: 2021-05-31

## 2021-02-16 RX ORDER — CYCLOBENZAPRINE HCL 10 MG
10 TABLET ORAL 3 TIMES DAILY PRN
Qty: 20 TABLET | Refills: 0 | Status: SHIPPED | OUTPATIENT
Start: 2021-02-16 | End: 2021-02-23

## 2021-02-16 RX ORDER — BUDESONIDE AND FORMOTEROL FUMARATE DIHYDRATE 80; 4.5 UG/1; UG/1
1 AEROSOL RESPIRATORY (INHALATION) DAILY
COMMUNITY
Start: 2021-01-30 | End: 2021-12-17

## 2021-02-16 RX ORDER — NAPROXEN 500 MG/1
500 TABLET ORAL 2 TIMES DAILY PRN
Qty: 20 TABLET | Refills: 0 | Status: SHIPPED | OUTPATIENT
Start: 2021-02-16 | End: 2021-02-23

## 2021-02-16 RX ORDER — PERPHENAZINE 4 MG/1
4 TABLET, FILM COATED ORAL 3 TIMES DAILY
COMMUNITY
Start: 2021-02-01 | End: 2021-05-13

## 2021-02-16 RX ORDER — IBUPROFEN 600 MG/1
600 TABLET ORAL ONCE
Status: COMPLETED | OUTPATIENT
Start: 2021-02-16 | End: 2021-02-16

## 2021-02-16 NOTE — ED INITIAL ASSESSMENT (HPI)
PT to the ED for evaluation of bilateral neck pain that radiates into the shoulders. No known injury. Pt reports taking gabapentin and tylenol at 0400 with little relief.

## 2021-02-16 NOTE — ED PROVIDER NOTES
Patient Seen in: [facility] Emergency Department In [place]      History   Patient presents with:  Neck Pain    Stated Complaint: neck pain. Took tylenol and gabapentin with little relief.      HPI/Subjective:   HPI    Patient is a 68-year-old FTM who stat as noted above.     Physical Exam     ED Triage Vitals [02/16/21 0622]   /66   Pulse 105   Resp 16   Temp 97.5 °F (36.4 °C)   Temp src Temporal   SpO2 97 %   O2 Device None (Room air)       Current:/66   Pulse 105   Temp 97.5 °F (36.4 °C) (Tempo AM    START taking these medications    naproxen 500 MG Oral Tab  Take 1 tablet (500 mg total) by mouth 2 (two) times daily as needed., Normal, Disp-20 tablet, R-0    cyclobenzaprine 10 MG Oral Tab  Take 1 tablet (10 mg total) by mouth 3 (three) times ronald

## 2021-03-03 ENCOUNTER — APPOINTMENT (OUTPATIENT)
Dept: GENERAL RADIOLOGY | Age: 26
End: 2021-03-03
Attending: NURSE PRACTITIONER
Payer: COMMERCIAL

## 2021-03-03 ENCOUNTER — HOSPITAL ENCOUNTER (OUTPATIENT)
Age: 26
Discharge: HOME | End: 2021-03-03
Payer: COMMERCIAL

## 2021-03-03 VITALS
DIASTOLIC BLOOD PRESSURE: 69 MMHG | HEIGHT: 69 IN | TEMPERATURE: 98 F | WEIGHT: 208 LBS | HEART RATE: 83 BPM | SYSTOLIC BLOOD PRESSURE: 128 MMHG | BODY MASS INDEX: 30.81 KG/M2 | OXYGEN SATURATION: 99 % | RESPIRATION RATE: 16 BRPM

## 2021-03-03 DIAGNOSIS — S66.912A WRIST STRAIN, LEFT, INITIAL ENCOUNTER: ICD-10-CM

## 2021-03-03 DIAGNOSIS — S46.912A STRAIN OF UPPER ARM, LEFT, INITIAL ENCOUNTER: Primary | ICD-10-CM

## 2021-03-03 PROCEDURE — 73110 X-RAY EXAM OF WRIST: CPT | Performed by: NURSE PRACTITIONER

## 2021-03-03 PROCEDURE — 99214 OFFICE O/P EST MOD 30 MIN: CPT

## 2021-03-03 PROCEDURE — 73030 X-RAY EXAM OF SHOULDER: CPT | Performed by: NURSE PRACTITIONER

## 2021-03-03 PROCEDURE — 99213 OFFICE O/P EST LOW 20 MIN: CPT

## 2021-03-03 RX ORDER — NAPROXEN 500 MG/1
500 TABLET ORAL 2 TIMES DAILY PRN
Qty: 20 TABLET | Refills: 0 | Status: SHIPPED | OUTPATIENT
Start: 2021-03-03 | End: 2021-03-10

## 2021-03-04 ENCOUNTER — APPOINTMENT (OUTPATIENT)
Dept: OTHER | Facility: HOSPITAL | Age: 26
End: 2021-03-04
Attending: PREVENTIVE MEDICINE

## 2021-03-04 NOTE — ED INITIAL ASSESSMENT (HPI)
Pt presents tonight with c/o left hand pain that radiates all the way up his left arm. Pt states that he was carrying a heavy bag of wet dog poop earlier today while at work around 1330 and tweaked his arm.

## 2021-03-09 ENCOUNTER — OFFICE VISIT (OUTPATIENT)
Dept: OTHER | Facility: HOSPITAL | Age: 26
End: 2021-03-09
Attending: PREVENTIVE MEDICINE

## 2021-03-09 ENCOUNTER — HOSPITAL ENCOUNTER (OUTPATIENT)
Dept: GENERAL RADIOLOGY | Facility: HOSPITAL | Age: 26
Discharge: HOME | End: 2021-03-09
Attending: PREVENTIVE MEDICINE

## 2021-03-09 DIAGNOSIS — M79.642 HAND PAIN, LEFT: ICD-10-CM

## 2021-03-09 DIAGNOSIS — S46.919A ELBOW STRAIN: ICD-10-CM

## 2021-03-09 DIAGNOSIS — M79.642 HAND PAIN, LEFT: Primary | ICD-10-CM

## 2021-03-09 PROCEDURE — 73080 X-RAY EXAM OF ELBOW: CPT | Performed by: PREVENTIVE MEDICINE

## 2021-03-09 PROCEDURE — 73130 X-RAY EXAM OF HAND: CPT | Performed by: PREVENTIVE MEDICINE

## 2021-03-11 ENCOUNTER — APPOINTMENT (OUTPATIENT)
Dept: CT IMAGING | Age: 26
End: 2021-03-11
Attending: EMERGENCY MEDICINE

## 2021-03-11 ENCOUNTER — HOSPITAL ENCOUNTER (EMERGENCY)
Age: 26
Discharge: HOME | End: 2021-03-11
Attending: EMERGENCY MEDICINE

## 2021-03-11 VITALS
DIASTOLIC BLOOD PRESSURE: 75 MMHG | BODY MASS INDEX: 30.51 KG/M2 | HEIGHT: 69 IN | TEMPERATURE: 98 F | HEART RATE: 68 BPM | SYSTOLIC BLOOD PRESSURE: 130 MMHG | WEIGHT: 206 LBS | RESPIRATION RATE: 16 BRPM | OXYGEN SATURATION: 100 %

## 2021-03-11 DIAGNOSIS — N39.0 URINARY TRACT INFECTION WITHOUT HEMATURIA, SITE UNSPECIFIED: ICD-10-CM

## 2021-03-11 DIAGNOSIS — R55 SYNCOPE AND COLLAPSE: ICD-10-CM

## 2021-03-11 DIAGNOSIS — R41.82 ALTERED MENTAL STATUS, UNSPECIFIED ALTERED MENTAL STATUS TYPE: Primary | ICD-10-CM

## 2021-03-11 LAB
ALBUMIN SERPL-MCNC: 3.9 G/DL (ref 3.4–5)
ALBUMIN/GLOB SERPL: 1.1 {RATIO} (ref 1–2)
ALP LIVER SERPL-CCNC: 99 U/L
ALT SERPL-CCNC: 55 U/L
ANION GAP SERPL CALC-SCNC: 7 MMOL/L (ref 0–18)
AST SERPL-CCNC: 36 U/L (ref 15–37)
BASOPHILS # BLD AUTO: 0.04 X10(3) UL (ref 0–0.2)
BASOPHILS NFR BLD AUTO: 0.4 %
BENZODIAZ UR QL SCN: NEGATIVE
BILIRUB SERPL-MCNC: 0.6 MG/DL (ref 0.1–2)
BILIRUB UR QL STRIP.AUTO: NEGATIVE
BUN BLD-MCNC: 16 MG/DL (ref 7–18)
BUN/CREAT SERPL: 15 (ref 10–20)
CALCIUM BLD-MCNC: 9 MG/DL (ref 8.5–10.1)
CANNABINOIDS UR QL SCN: NEGATIVE
CHLORIDE SERPL-SCNC: 110 MMOL/L (ref 98–112)
CO2 SERPL-SCNC: 23 MMOL/L (ref 21–32)
COCAINE UR QL: NEGATIVE
COLOR UR AUTO: YELLOW
CREAT BLD-MCNC: 1.07 MG/DL
CREAT UR-SCNC: 234 MG/DL
DEPRECATED RDW RBC AUTO: 42.6 FL (ref 35.1–46.3)
EOSINOPHIL # BLD AUTO: 0.21 X10(3) UL (ref 0–0.7)
EOSINOPHIL NFR BLD AUTO: 2.4 %
ERYTHROCYTE [DISTWIDTH] IN BLOOD BY AUTOMATED COUNT: 12.9 % (ref 11–15)
GLOBULIN PLAS-MCNC: 3.5 G/DL (ref 2.8–4.4)
GLUCOSE BLD-MCNC: 91 MG/DL (ref 70–99)
GLUCOSE UR STRIP.AUTO-MCNC: NEGATIVE MG/DL
HCT VFR BLD AUTO: 42 %
HGB BLD-MCNC: 14.1 G/DL
IMM GRANULOCYTES # BLD AUTO: 0.02 X10(3) UL (ref 0–1)
IMM GRANULOCYTES NFR BLD: 0.2 %
KETONES UR STRIP.AUTO-MCNC: 40 MG/DL
LYMPHOCYTES # BLD AUTO: 1.98 X10(3) UL (ref 1–4)
LYMPHOCYTES NFR BLD AUTO: 22.2 %
M PROTEIN MFR SERPL ELPH: 7.4 G/DL (ref 6.4–8.2)
MCH RBC QN AUTO: 30.6 PG (ref 26–34)
MCHC RBC AUTO-ENTMCNC: 33.6 G/DL (ref 31–37)
MCV RBC AUTO: 91.1 FL
MONOCYTES # BLD AUTO: 0.71 X10(3) UL (ref 0.1–1)
MONOCYTES NFR BLD AUTO: 8 %
NEUTROPHILS # BLD AUTO: 5.95 X10 (3) UL (ref 1.5–7.7)
NEUTROPHILS # BLD AUTO: 5.95 X10(3) UL (ref 1.5–7.7)
NEUTROPHILS NFR BLD AUTO: 66.8 %
NITRITE UR QL STRIP.AUTO: NEGATIVE
OPIATES UR QL SCN: NEGATIVE
OSMOLALITY SERPL CALC.SUM OF ELEC: 291 MOSM/KG (ref 275–295)
PH UR STRIP.AUTO: 7 [PH] (ref 5–8)
PLATELET # BLD AUTO: 301 10(3)UL (ref 150–450)
POCT LOT NUMBER: NORMAL
POCT URINE PREGNANCY: NEGATIVE
POTASSIUM SERPL-SCNC: 3.6 MMOL/L (ref 3.5–5.1)
PROT UR STRIP.AUTO-MCNC: NEGATIVE MG/DL
RBC # BLD AUTO: 4.61 X10(6)UL
SODIUM SERPL-SCNC: 140 MMOL/L (ref 136–145)
SP GR UR STRIP.AUTO: 1.02 (ref 1–1.03)
UROBILINOGEN UR STRIP.AUTO-MCNC: 1 MG/DL
WBC # BLD AUTO: 8.9 X10(3) UL (ref 4–11)
WBC #/AREA URNS AUTO: >50 /HPF

## 2021-03-11 PROCEDURE — 87086 URINE CULTURE/COLONY COUNT: CPT | Performed by: EMERGENCY MEDICINE

## 2021-03-11 PROCEDURE — 87186 SC STD MICRODIL/AGAR DIL: CPT | Performed by: EMERGENCY MEDICINE

## 2021-03-11 PROCEDURE — 99285 EMERGENCY DEPT VISIT HI MDM: CPT

## 2021-03-11 PROCEDURE — 96361 HYDRATE IV INFUSION ADD-ON: CPT

## 2021-03-11 PROCEDURE — 93005 ELECTROCARDIOGRAM TRACING: CPT

## 2021-03-11 PROCEDURE — 70450 CT HEAD/BRAIN W/O DYE: CPT | Performed by: EMERGENCY MEDICINE

## 2021-03-11 PROCEDURE — 81025 URINE PREGNANCY TEST: CPT

## 2021-03-11 PROCEDURE — 81001 URINALYSIS AUTO W/SCOPE: CPT | Performed by: EMERGENCY MEDICINE

## 2021-03-11 PROCEDURE — 85025 COMPLETE CBC W/AUTO DIFF WBC: CPT | Performed by: EMERGENCY MEDICINE

## 2021-03-11 PROCEDURE — 93010 ELECTROCARDIOGRAM REPORT: CPT

## 2021-03-11 PROCEDURE — 96365 THER/PROPH/DIAG IV INF INIT: CPT

## 2021-03-11 PROCEDURE — 80307 DRUG TEST PRSMV CHEM ANLYZR: CPT | Performed by: EMERGENCY MEDICINE

## 2021-03-11 PROCEDURE — 81015 MICROSCOPIC EXAM OF URINE: CPT | Performed by: EMERGENCY MEDICINE

## 2021-03-11 PROCEDURE — 80053 COMPREHEN METABOLIC PANEL: CPT | Performed by: EMERGENCY MEDICINE

## 2021-03-11 RX ORDER — CEPHALEXIN 500 MG/1
500 CAPSULE ORAL 3 TIMES DAILY
Qty: 15 CAPSULE | Refills: 0 | Status: SHIPPED | OUTPATIENT
Start: 2021-03-11 | End: 2021-03-16
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
## 2021-03-12 LAB
ATRIAL RATE: 83 BPM
P AXIS: 57 DEGREES
P-R INTERVAL: 146 MS
Q-T INTERVAL: 360 MS
QRS DURATION: 70 MS
QTC CALCULATION (BEZET): 423 MS
R AXIS: 60 DEGREES
T AXIS: 57 DEGREES
VENTRICULAR RATE: 83 BPM

## 2021-03-12 NOTE — ED NOTES
Pt back from CT. RN asked patient Lisa Ly are you feeling\" and patient responded \"purple. \"  I clarified the question and patient responded \"dizzy. \" MD aware.

## 2021-03-12 NOTE — ED PROVIDER NOTES
Patient Seen in: THE [facility] Emergency Department In [place]      History   Patient presents with:  Syncope    Stated Complaint: syncopal episode for a \"couple minutes\" while parked in car.   States he still \"*    HPI/Subjective:   HPI    30year-old pre 93.4 kg   LMP 01/10/2021   SpO2 98%   BMI 30.42 kg/m²         Physical Exam  Vitals and nursing note reviewed. Constitutional:       General: He is not in acute distress. Appearance: He is well-developed. He is not diaphoretic.    HENT:      Head: Atr Positive (*)     All other components within normal limits    Narrative:     Results of the Urine Drug Screen should be used only for medical purposes.    URINALYSIS WITH CULTURE REFLEX - Abnormal; Notable for the following components:    Clarity Urine Clou (Reviewed)  ------------------------------------------------------------  Time: 03/11 1915  Value: Ecstasy Urine(!): Presumed Positive  Comment: (Reviewed)  ------------------------------------------------------------  Time: 03/11 1915  Value: Leukocyte Es DO Yue on 3/11/2021 at 6:55 PM         Finalized by (CST): Ramana Vidal DO on 3/11/2021 at 6:56 PM                 MDM      53-year-old presents to the emergency department with some altered mental status and concern for syncopal episode.   This could a

## 2021-05-13 ENCOUNTER — HOSPITAL ENCOUNTER (EMERGENCY)
Age: 26
Discharge: NURSING, REHAB OR LONG-TERM CARE | End: 2021-05-14
Attending: EMERGENCY MEDICINE
Payer: COMMERCIAL

## 2021-05-13 DIAGNOSIS — T50.904A DRUG OVERDOSE, UNDETERMINED INTENT, INITIAL ENCOUNTER: Primary | ICD-10-CM

## 2021-05-13 DIAGNOSIS — F32.A DEPRESSION, UNSPECIFIED DEPRESSION TYPE: ICD-10-CM

## 2021-05-13 DIAGNOSIS — F41.9 ANXIETY: ICD-10-CM

## 2021-05-13 PROCEDURE — 81001 URINALYSIS AUTO W/SCOPE: CPT | Performed by: EMERGENCY MEDICINE

## 2021-05-13 PROCEDURE — 93010 ELECTROCARDIOGRAM REPORT: CPT

## 2021-05-13 PROCEDURE — 85025 COMPLETE CBC W/AUTO DIFF WBC: CPT | Performed by: EMERGENCY MEDICINE

## 2021-05-13 PROCEDURE — 82962 GLUCOSE BLOOD TEST: CPT

## 2021-05-13 PROCEDURE — 87086 URINE CULTURE/COLONY COUNT: CPT | Performed by: EMERGENCY MEDICINE

## 2021-05-13 PROCEDURE — 99285 EMERGENCY DEPT VISIT HI MDM: CPT

## 2021-05-13 PROCEDURE — 80307 DRUG TEST PRSMV CHEM ANLYZR: CPT | Performed by: EMERGENCY MEDICINE

## 2021-05-13 PROCEDURE — 87186 SC STD MICRODIL/AGAR DIL: CPT | Performed by: EMERGENCY MEDICINE

## 2021-05-13 PROCEDURE — 93005 ELECTROCARDIOGRAM TRACING: CPT

## 2021-05-13 PROCEDURE — 87077 CULTURE AEROBIC IDENTIFY: CPT | Performed by: EMERGENCY MEDICINE

## 2021-05-13 PROCEDURE — 80053 COMPREHEN METABOLIC PANEL: CPT | Performed by: EMERGENCY MEDICINE

## 2021-05-13 PROCEDURE — 96361 HYDRATE IV INFUSION ADD-ON: CPT

## 2021-05-13 PROCEDURE — 80143 DRUG ASSAY ACETAMINOPHEN: CPT | Performed by: EMERGENCY MEDICINE

## 2021-05-13 PROCEDURE — 80179 DRUG ASSAY SALICYLATE: CPT | Performed by: EMERGENCY MEDICINE

## 2021-05-13 PROCEDURE — 83690 ASSAY OF LIPASE: CPT | Performed by: EMERGENCY MEDICINE

## 2021-05-13 PROCEDURE — 82077 ASSAY SPEC XCP UR&BREATH IA: CPT | Performed by: EMERGENCY MEDICINE

## 2021-05-13 PROCEDURE — 96374 THER/PROPH/DIAG INJ IV PUSH: CPT

## 2021-05-13 RX ORDER — ONDANSETRON 2 MG/ML
4 INJECTION INTRAMUSCULAR; INTRAVENOUS ONCE
Status: COMPLETED | OUTPATIENT
Start: 2021-05-13 | End: 2021-05-13

## 2021-05-13 RX ORDER — SODIUM CHLORIDE 9 MG/ML
125 INJECTION, SOLUTION INTRAVENOUS CONTINUOUS
Status: DISCONTINUED | OUTPATIENT
Start: 2021-05-13 | End: 2021-05-14

## 2021-05-13 RX ORDER — HALOPERIDOL 5 MG
5 TABLET ORAL 2 TIMES DAILY
Status: ON HOLD | COMMUNITY
End: 2021-05-31

## 2021-05-13 NOTE — ED INITIAL ASSESSMENT (HPI)
Took gabapentin,xanax, clonipin,haldo to try to sleep, called poison control and was advised to come here.  Denies being suicidal just wanted help to sleep was recently dc from hospital on Tuesday morning

## 2021-05-13 NOTE — ED QUICK NOTES
Poison control notified and spoke with Carolin Bush with case #[id number].   Er md aware of recommendations

## 2021-05-13 NOTE — ED QUICK NOTES
Patient's belongings secured in bags [id number] and [id number] in locker 1 by [facility].

## 2021-05-13 NOTE — ED PROVIDER NOTES
Patient Seen in: THE [facility] Emergency Department In [place]      History   Patient presents with:  Eval-P    Stated Complaint: OD/eval p    HPI/Subjective:   HPI    Patient presents to the emergency room for evaluation after overdose.   Patient states  too above.    Physical Exam     ED Triage Vitals [05/13/21 1609]   /79   Pulse 106   Resp 22   Temp 98.9 °F (37.2 °C)   Temp src Oral   SpO2 97 %   O2 Device None (Room air)       Current:BP 96/55   Pulse 66   Temp 98.9 °F (37.2 °C) (Oral)   Resp 18   Wt POCT GLUCOSE - Normal   CBC WITH DIFFERENTIAL WITH PLATELET    Narrative: The following orders were created for panel order CBC WITH DIFFERENTIAL WITH PLATELET.   Procedure                               Abnormality         Status                     - patient presents with multiingestion overdose, patient is evasive and not answering questions directly. Patient is high risk and I am concerned for the patient safety. Psychiatrist agrees with plan.   Patient will be admitted to psychiatric hospital for f

## 2021-05-14 VITALS
DIASTOLIC BLOOD PRESSURE: 60 MMHG | RESPIRATION RATE: 16 BRPM | BODY MASS INDEX: 33 KG/M2 | SYSTOLIC BLOOD PRESSURE: 111 MMHG | TEMPERATURE: 99 F | HEART RATE: 83 BPM | OXYGEN SATURATION: 98 % | WEIGHT: 224 LBS

## 2021-05-14 PROCEDURE — 0241U SARS-COV-2/FLU A AND B/RSV BY PCR (GENEXPERT): CPT | Performed by: EMERGENCY MEDICINE

## 2021-05-14 RX ORDER — ONDANSETRON 4 MG/1
4 TABLET, ORALLY DISINTEGRATING ORAL ONCE
Status: COMPLETED | OUTPATIENT
Start: 2021-05-14 | End: 2021-05-14

## 2021-05-14 RX ORDER — LORAZEPAM 1 MG/1
1 TABLET ORAL ONCE
Status: DISCONTINUED | OUTPATIENT
Start: 2021-05-14 | End: 2021-05-14

## 2021-05-14 RX ORDER — LORAZEPAM 1 MG/1
1 TABLET ORAL ONCE
Status: COMPLETED | OUTPATIENT
Start: 2021-05-14 | End: 2021-05-14

## 2021-05-14 NOTE — ED NOTES
Referral and Clinicals were sent to [address] [facility] and [address] [facility].

## 2021-05-14 NOTE — ED NOTES
Contacted the following facilities for in-patient:    [address]- No available beds    [facility]- Left a voice mail message    [facility]- Phone kept ringing. Unable to reach Intake. Currently, [facility] and [facility] are reviewing.

## 2021-05-14 NOTE — PROGRESS NOTES
05/13/21 1926   COVID Exposure Risk Screening   Have you been practicing social distancing? Yes   Have you been wearing a mask when in the community? Yes   Are the people you live with following social distancing and wearing a mask?  Yes  [facility]

## 2021-05-14 NOTE — ED NOTES
Received a call from [facility] stating that they are unable to accommodate. Left voice mail message for [facility] to make referral.    [facility] and spoke with Will.  Intake informed Discharge  that they will ca

## 2021-05-14 NOTE — BH LEVEL OF CARE ASSESSMENT
Crisis Evaluation Assessment    Ana López YOB: 1995   Age 30year old MRN [id number]   Location [facility] Attending Sumi Park DO      Patient's legal sex: female  Patient identifies as: transgender m just reconnected. She states out of the blue he messaged her on Facebook asking her to be his mentor again. She states today he said he took all his pills at once because he wanted to go to sleep.  She states Betzaida Whitlock stated he wanted to wake up and just wante experience of thoughts of dying by suicide: Other Audrene Stark denies SI)  Protective Factors: \"my dog, my grandmother, my mom, my brother, and my cat. \"  Past Suicidal Ideation: Ideation; Attempt; Intention;Method/Plan  Describe: negra Mejia states his last Si was 3 states that he might have an ulcer. He states he's gained 10 lbs in a week. He reports hx of an eating d/o and states he has an eating d/o therapist \"so it's all good. \"     He reports less interest and states \"I don't know what I like anymore. \" He repo Nov 2016, April 2016, and Oct 2015. He has hx of IOP/PHP at [facility] in 2016 and 2015. Per previous assessment, Marce Fuller has a hx of admission at [facility] in 2016.             Relevant Social History:  Marce Fuller states he lives with his grandma who has diet, avoiding certain foods or attempting to maintain certain dietary rules?:  Devon Soler states he is supposed to be on a bland diet due to he might have an ulcer but states that he doesn't follow the bland diet)  When you do eat, are you : Always eating al you describe the contents of your last excessive eating episode?: Last binged yesterday; states he ate double cheeseburger large cheese santacruz, and a chocolate eclaire cake  How long did it take you to complete the above? : 5 minutes  Do you continue to eat d the past 30 days: Constipation;Dizziness;Vomitting Blood;Cramping;Weakness;Bloating; Headache;Nausea; Diarrhea; Fainting; Difficulty Concentrating; Fatigue;Cold extremities; Discolored Extremities (He states that he has IBS and experiences some of these sx's due judgment as evidenced by: Emilie Montero states he took his night time medication along with Xanax and an extra Klonipin today between 1:30-2:00pm  Thought Patterns  Clarity/Relevance: Coherent  Flow: Organized  Content: Ordinary  Level of Consciousness: Alert  Elpidio Potts just don't like getting wet\" due to past trauma. 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 He reports he has been putting things off more than usual.  His mentor, Michele Owen, was present in the ER.   She states that s

## 2021-05-14 NOTE — ED PROVIDER NOTES
Still awaiting psychiatric placement. Patient was given Ativan for anxiety.   Otherwise calm/cooperative

## 2021-05-14 NOTE — ED QUICK NOTES
[facility] continues to work on placement. They spoke with the pt on the phone. Pt c/o increasing anxiety at this time.   Er md aware

## 2021-05-14 NOTE — ED PROVIDER NOTES
24-year-old transgender male presenting after intentional overdose with suicidal ideation. Medically cleared. Evaluated by Christine Pinto and plan is for psychiatric admission. Patient is preliminarily accepted to [facility] for transfer.   Has remained ca

## 2021-05-14 NOTE — ED NOTES
Transfer Summary-     Deflected:   [facility]- resubmit clinical after [address]- resubmitted    Sent:

## 2021-05-15 ENCOUNTER — HOSPITAL ENCOUNTER (EMERGENCY)
Facility: HOSPITAL | Age: 26
Discharge: HOME | End: 2021-05-15
Attending: EMERGENCY MEDICINE
Payer: COMMERCIAL

## 2021-05-15 ENCOUNTER — APPOINTMENT (OUTPATIENT)
Dept: GENERAL RADIOLOGY | Facility: HOSPITAL | Age: 26
End: 2021-05-15
Attending: EMERGENCY MEDICINE
Payer: COMMERCIAL

## 2021-05-15 ENCOUNTER — APPOINTMENT (OUTPATIENT)
Dept: ULTRASOUND IMAGING | Facility: HOSPITAL | Age: 26
End: 2021-05-15
Attending: EMERGENCY MEDICINE
Payer: COMMERCIAL

## 2021-05-15 VITALS
HEART RATE: 73 BPM | HEIGHT: 70 IN | WEIGHT: 224 LBS | DIASTOLIC BLOOD PRESSURE: 80 MMHG | TEMPERATURE: 99 F | OXYGEN SATURATION: 99 % | RESPIRATION RATE: 24 BRPM | BODY MASS INDEX: 32.07 KG/M2 | SYSTOLIC BLOOD PRESSURE: 142 MMHG

## 2021-05-15 DIAGNOSIS — R10.13 ABDOMINAL PAIN, EPIGASTRIC: Primary | ICD-10-CM

## 2021-05-15 DIAGNOSIS — R51.9 NONINTRACTABLE HEADACHE, UNSPECIFIED CHRONICITY PATTERN, UNSPECIFIED HEADACHE TYPE: ICD-10-CM

## 2021-05-15 DIAGNOSIS — R11.2 NAUSEA AND VOMITING IN ADULT: ICD-10-CM

## 2021-05-15 DIAGNOSIS — N39.0 URINARY TRACT INFECTION WITHOUT HEMATURIA, SITE UNSPECIFIED: ICD-10-CM

## 2021-05-15 PROBLEM — F33.2 SEVERE RECURRENT MAJOR DEPRESSION WITHOUT PSYCHOTIC FEATURES (HCC): Status: ACTIVE | Noted: 2018-09-30

## 2021-05-15 PROBLEM — F41.9 ANXIETY DISORDER, UNSPECIFIED: Status: ACTIVE | Noted: 2017-09-27

## 2021-05-15 PROCEDURE — 96361 HYDRATE IV INFUSION ADD-ON: CPT

## 2021-05-15 PROCEDURE — 76700 US EXAM ABDOM COMPLETE: CPT | Performed by: EMERGENCY MEDICINE

## 2021-05-15 PROCEDURE — 96374 THER/PROPH/DIAG INJ IV PUSH: CPT

## 2021-05-15 PROCEDURE — 85025 COMPLETE CBC W/AUTO DIFF WBC: CPT | Performed by: EMERGENCY MEDICINE

## 2021-05-15 PROCEDURE — 81001 URINALYSIS AUTO W/SCOPE: CPT | Performed by: EMERGENCY MEDICINE

## 2021-05-15 PROCEDURE — 71045 X-RAY EXAM CHEST 1 VIEW: CPT | Performed by: EMERGENCY MEDICINE

## 2021-05-15 PROCEDURE — 99284 EMERGENCY DEPT VISIT MOD MDM: CPT

## 2021-05-15 PROCEDURE — 96375 TX/PRO/DX INJ NEW DRUG ADDON: CPT

## 2021-05-15 PROCEDURE — 80053 COMPREHEN METABOLIC PANEL: CPT | Performed by: EMERGENCY MEDICINE

## 2021-05-15 PROCEDURE — 83690 ASSAY OF LIPASE: CPT | Performed by: EMERGENCY MEDICINE

## 2021-05-15 RX ORDER — ONDANSETRON 2 MG/ML
4 INJECTION INTRAMUSCULAR; INTRAVENOUS ONCE
Status: COMPLETED | OUTPATIENT
Start: 2021-05-15 | End: 2021-05-15

## 2021-05-15 RX ORDER — DIPHENHYDRAMINE HYDROCHLORIDE 50 MG/ML
25 INJECTION INTRAMUSCULAR; INTRAVENOUS ONCE
Status: COMPLETED | OUTPATIENT
Start: 2021-05-15 | End: 2021-05-15

## 2021-05-15 RX ORDER — KETOROLAC TROMETHAMINE 30 MG/ML
15 INJECTION, SOLUTION INTRAMUSCULAR; INTRAVENOUS ONCE
Status: COMPLETED | OUTPATIENT
Start: 2021-05-15 | End: 2021-05-15

## 2021-05-15 RX ORDER — SULFAMETHOXAZOLE AND TRIMETHOPRIM 800; 160 MG/1; MG/1
1 TABLET ORAL 2 TIMES DAILY
Qty: 6 TABLET | Refills: 0 | Status: ON HOLD | OUTPATIENT
Start: 2021-05-15 | End: 2021-05-31

## 2021-05-15 NOTE — ED INITIAL ASSESSMENT (HPI)
Pt presents to ER with vomiting. Pt actually vomited 3xs. Severe nausea. No diarrhea. No fever. Pt is currently at [facility] of accidental overdose. Pt feeling anxious. Pt states feeling sharp abd pain. Pt is speaking clearly. Skin warm dry.  Respirations equal a

## 2021-05-16 NOTE — ED QUICK NOTES
Nurse-to-nurse report given to [address] at [facility].

## 2021-05-16 NOTE — ED QUICK NOTES
Spoke with Geo Jeff RN from [facility] regarding pt transport back to [facility]. They will sending a car. Pt and [facility] updated. Pt, ambulatory with steady gait when leaving ED.

## 2021-06-25 ENCOUNTER — LAB ENCOUNTER (OUTPATIENT)
Dept: LAB | Age: 26
End: 2021-06-25
Attending: INTERNAL MEDICINE
Payer: COMMERCIAL

## 2021-06-25 DIAGNOSIS — Z01.818 PRE-OP TESTING: ICD-10-CM

## 2021-06-28 PROBLEM — R10.84 ABDOMINAL PAIN, GENERALIZED: Status: ACTIVE | Noted: 2021-06-28

## 2021-08-02 ENCOUNTER — APPOINTMENT (OUTPATIENT)
Dept: ULTRASOUND IMAGING | Facility: HOSPITAL | Age: 26
End: 2021-08-02
Attending: EMERGENCY MEDICINE
Payer: MEDICAID

## 2021-08-02 ENCOUNTER — HOSPITAL ENCOUNTER (EMERGENCY)
Facility: HOSPITAL | Age: 26
Discharge: HOME | End: 2021-08-02
Attending: EMERGENCY MEDICINE
Payer: MEDICAID

## 2021-08-02 VITALS
DIASTOLIC BLOOD PRESSURE: 61 MMHG | HEIGHT: 70 IN | HEART RATE: 69 BPM | WEIGHT: 202 LBS | BODY MASS INDEX: 28.92 KG/M2 | SYSTOLIC BLOOD PRESSURE: 96 MMHG | RESPIRATION RATE: 16 BRPM | TEMPERATURE: 97 F | OXYGEN SATURATION: 99 %

## 2021-08-02 DIAGNOSIS — R10.9 ABDOMINAL PAIN, UNSPECIFIED ABDOMINAL LOCATION: Primary | ICD-10-CM

## 2021-08-02 DIAGNOSIS — R31.9 URINARY TRACT INFECTION WITH HEMATURIA, SITE UNSPECIFIED: ICD-10-CM

## 2021-08-02 DIAGNOSIS — N39.0 URINARY TRACT INFECTION WITH HEMATURIA, SITE UNSPECIFIED: ICD-10-CM

## 2021-08-02 LAB
ALBUMIN SERPL-MCNC: 3.8 G/DL (ref 3.4–5)
ALBUMIN/GLOB SERPL: 1 {RATIO} (ref 1–2)
ALP LIVER SERPL-CCNC: 124 U/L
ALT SERPL-CCNC: 60 U/L
ANION GAP SERPL CALC-SCNC: 5 MMOL/L (ref 0–18)
AST SERPL-CCNC: 30 U/L (ref 15–37)
B-HCG UR QL: NEGATIVE
BASOPHILS # BLD AUTO: 0.04 X10(3) UL (ref 0–0.2)
BASOPHILS NFR BLD AUTO: 0.5 %
BILIRUB SERPL-MCNC: 0.7 MG/DL (ref 0.1–2)
BILIRUB UR QL CFM: POSITIVE
BUN BLD-MCNC: 11 MG/DL (ref 7–18)
CALCIUM BLD-MCNC: 8.7 MG/DL (ref 8.5–10.1)
CHLORIDE SERPL-SCNC: 109 MMOL/L (ref 98–112)
CO2 SERPL-SCNC: 26 MMOL/L (ref 21–32)
CREAT BLD-MCNC: 1.3 MG/DL
EOSINOPHIL # BLD AUTO: 0.07 X10(3) UL (ref 0–0.7)
EOSINOPHIL NFR BLD AUTO: 1 %
ERYTHROCYTE [DISTWIDTH] IN BLOOD BY AUTOMATED COUNT: 12.5 %
GLOBULIN PLAS-MCNC: 3.7 G/DL (ref 2.8–4.4)
GLUCOSE BLD-MCNC: 77 MG/DL (ref 70–99)
GLUCOSE UR STRIP.AUTO-MCNC: NEGATIVE MG/DL
HCT VFR BLD AUTO: 47.4 %
HGB BLD-MCNC: 16.1 G/DL
IMM GRANULOCYTES # BLD AUTO: 0.02 X10(3) UL (ref 0–1)
IMM GRANULOCYTES NFR BLD: 0.3 %
KETONES UR STRIP.AUTO-MCNC: 20 MG/DL
LIPASE SERPL-CCNC: 58 U/L (ref 73–393)
LYMPHOCYTES # BLD AUTO: 2.33 X10(3) UL (ref 1–4)
LYMPHOCYTES NFR BLD AUTO: 32 %
M PROTEIN MFR SERPL ELPH: 7.5 G/DL (ref 6.4–8.2)
MCH RBC QN AUTO: 31.1 PG (ref 26–34)
MCHC RBC AUTO-ENTMCNC: 34 G/DL (ref 31–37)
MCV RBC AUTO: 91.5 FL
MONOCYTES # BLD AUTO: 0.77 X10(3) UL (ref 0.1–1)
MONOCYTES NFR BLD AUTO: 10.6 %
NEUTROPHILS # BLD AUTO: 4.05 X10 (3) UL (ref 1.5–7.7)
NEUTROPHILS # BLD AUTO: 4.05 X10(3) UL (ref 1.5–7.7)
NEUTROPHILS NFR BLD AUTO: 55.6 %
NITRITE UR QL STRIP.AUTO: NEGATIVE
OSMOLALITY SERPL CALC.SUM OF ELEC: 288 MOSM/KG (ref 275–295)
PH UR STRIP.AUTO: 5 [PH] (ref 5–8)
PLATELET # BLD AUTO: 261 10(3)UL (ref 150–450)
POTASSIUM SERPL-SCNC: 3.8 MMOL/L (ref 3.5–5.1)
PROT UR STRIP.AUTO-MCNC: 30 MG/DL
RBC # BLD AUTO: 5.18 X10(6)UL
SODIUM SERPL-SCNC: 140 MMOL/L (ref 136–145)
SP GR UR STRIP.AUTO: >1.03 (ref 1–1.03)
TROPONIN I SERPL-MCNC: <0.045 NG/ML (ref ?–0.04)
UROBILINOGEN UR STRIP.AUTO-MCNC: 4 MG/DL
WBC # BLD AUTO: 7.3 X10(3) UL (ref 4–11)

## 2021-08-02 PROCEDURE — 93005 ELECTROCARDIOGRAM TRACING: CPT

## 2021-08-02 PROCEDURE — 96365 THER/PROPH/DIAG IV INF INIT: CPT

## 2021-08-02 PROCEDURE — 84484 ASSAY OF TROPONIN QUANT: CPT | Performed by: EMERGENCY MEDICINE

## 2021-08-02 PROCEDURE — 81001 URINALYSIS AUTO W/SCOPE: CPT | Performed by: EMERGENCY MEDICINE

## 2021-08-02 PROCEDURE — 87086 URINE CULTURE/COLONY COUNT: CPT | Performed by: EMERGENCY MEDICINE

## 2021-08-02 PROCEDURE — 96375 TX/PRO/DX INJ NEW DRUG ADDON: CPT

## 2021-08-02 PROCEDURE — 83690 ASSAY OF LIPASE: CPT | Performed by: EMERGENCY MEDICINE

## 2021-08-02 PROCEDURE — 99284 EMERGENCY DEPT VISIT MOD MDM: CPT

## 2021-08-02 PROCEDURE — 85025 COMPLETE CBC W/AUTO DIFF WBC: CPT | Performed by: EMERGENCY MEDICINE

## 2021-08-02 PROCEDURE — 76700 US EXAM ABDOM COMPLETE: CPT | Performed by: EMERGENCY MEDICINE

## 2021-08-02 PROCEDURE — 80053 COMPREHEN METABOLIC PANEL: CPT | Performed by: EMERGENCY MEDICINE

## 2021-08-02 PROCEDURE — 93010 ELECTROCARDIOGRAM REPORT: CPT

## 2021-08-02 PROCEDURE — 81025 URINE PREGNANCY TEST: CPT

## 2021-08-02 RX ORDER — KETOROLAC TROMETHAMINE 30 MG/ML
15 INJECTION, SOLUTION INTRAMUSCULAR; INTRAVENOUS ONCE
Status: COMPLETED | OUTPATIENT
Start: 2021-08-02 | End: 2021-08-02

## 2021-08-02 RX ORDER — SUCRALFATE 1 G/1
1 TABLET ORAL
Qty: 40 TABLET | Refills: 0 | Status: SHIPPED | OUTPATIENT
Start: 2021-08-02 | End: 2021-08-12

## 2021-08-02 RX ORDER — MAGNESIUM HYDROXIDE/ALUMINUM HYDROXICE/SIMETHICONE 120; 1200; 1200 MG/30ML; MG/30ML; MG/30ML
30 SUSPENSION ORAL ONCE
Status: COMPLETED | OUTPATIENT
Start: 2021-08-02 | End: 2021-08-02

## 2021-08-02 RX ORDER — ONDANSETRON 2 MG/ML
4 INJECTION INTRAMUSCULAR; INTRAVENOUS ONCE
Status: CANCELLED | OUTPATIENT
Start: 2021-08-02 | End: 2021-08-02

## 2021-08-02 RX ORDER — CEFDINIR 300 MG/1
300 CAPSULE ORAL 2 TIMES DAILY
Qty: 14 CAPSULE | Refills: 0 | Status: SHIPPED | OUTPATIENT
Start: 2021-08-02 | End: 2021-08-09

## 2021-08-03 LAB
ATRIAL RATE: 64 BPM
P AXIS: 1 DEGREES
P-R INTERVAL: 124 MS
Q-T INTERVAL: 426 MS
QRS DURATION: 88 MS
QTC CALCULATION (BEZET): 439 MS
R AXIS: 71 DEGREES
T AXIS: 46 DEGREES
VENTRICULAR RATE: 64 BPM

## 2021-08-03 NOTE — ED PROVIDER NOTES
Patient Seen in: [facility] Emergency Department      History   Patient presents with:  Abdomen/Flank Pain  Dizziness    Stated Complaint: abd pain    HPI/Subjective:   HPI  22-year-old F to M gentleman here with complaint of epigastric pain severe seizure was at age 4 or 3   • Sleep disturbance    • Stomach pain    • Stool incontinence    • Stress    • Syncope    • Uncomfortable fullness after meals    • Vomiting blood    • Wears glasses    • Weight gain               Past Surgical History:   Proced tenderness skin: Warm and dry  Neurological: Awake alert, speech is normal        ED Course     Labs Reviewed   COMP METABOLIC PANEL (14) - Abnormal; Notable for the following components:       Result Value    GFR, Non- 56 (*)     All other Real time gray-scale ultrasound was used to evaluate the abdomen. The exam includes images of the liver, gallbladder, common bile duct, pancreas, spleen, kidneys, IVC, and aorta.   PATIENT STATED HISTORY: (As transcribed by Technologist)     FINDINGS:  MARK and hemodynamically stable, will discharge home with instructions to follow-up with [facility] GI.   Return precautions were discussed patient is in agreement with plan                             Disposition and Plan     Clinical Impression:  Abdominal pain, unsp

## 2021-08-03 NOTE — ED INITIAL ASSESSMENT (HPI)
C/o abdominal pain , +N/V, dizziness, lightheaded. Diagnosed with sludge in gallbladder last week at [facility]. + Syncopal episode today at 1630 at work.

## 2021-08-04 ENCOUNTER — OFFICE VISIT (OUTPATIENT)
Dept: INTERNAL MEDICINE CLINIC | Facility: CLINIC | Age: 26
End: 2021-08-04
Payer: COMMERCIAL

## 2021-08-04 VITALS
HEART RATE: 94 BPM | OXYGEN SATURATION: 98 % | WEIGHT: 203 LBS | RESPIRATION RATE: 17 BRPM | SYSTOLIC BLOOD PRESSURE: 100 MMHG | DIASTOLIC BLOOD PRESSURE: 76 MMHG | BODY MASS INDEX: 30.07 KG/M2 | HEIGHT: 69 IN

## 2021-08-04 DIAGNOSIS — F50.9 EATING DISORDER, UNSPECIFIED TYPE: ICD-10-CM

## 2021-08-04 DIAGNOSIS — E66.9 OBESITY WITH BODY MASS INDEX (BMI) OF 30.0 TO 39.9: ICD-10-CM

## 2021-08-04 DIAGNOSIS — K76.0 FATTY LIVER: ICD-10-CM

## 2021-08-04 DIAGNOSIS — R73.03 PREDIABETES: ICD-10-CM

## 2021-08-04 DIAGNOSIS — Z51.81 THERAPEUTIC DRUG MONITORING: Primary | ICD-10-CM

## 2021-08-04 DIAGNOSIS — F33.2 SEVERE RECURRENT MAJOR DEPRESSION WITHOUT PSYCHOTIC FEATURES (HCC): ICD-10-CM

## 2021-08-04 PROBLEM — R10.13 EPIGASTRIC PAIN: Status: ACTIVE | Noted: 2021-07-28

## 2021-08-04 PROBLEM — K82.8 GALLBLADDER SLUDGE: Status: ACTIVE | Noted: 2021-07-28

## 2021-08-04 PROCEDURE — 3008F BODY MASS INDEX DOCD: CPT | Performed by: NURSE PRACTITIONER

## 2021-08-04 PROCEDURE — 99204 OFFICE O/P NEW MOD 45 MIN: CPT | Performed by: NURSE PRACTITIONER

## 2021-08-04 PROCEDURE — 3074F SYST BP LT 130 MM HG: CPT | Performed by: NURSE PRACTITIONER

## 2021-08-04 PROCEDURE — 3078F DIAST BP <80 MM HG: CPT | Performed by: NURSE PRACTITIONER

## 2021-08-04 RX ORDER — TOPIRAMATE 50 MG/1
50 TABLET, FILM COATED ORAL
COMMUNITY
Start: 2021-01-30 | End: 2021-10-04

## 2021-08-04 NOTE — PROGRESS NOTES
HISTORY OF PRESENT ILLNESS  Patient presents with:  Weight Problem: referred by Taylor Schwartz, never tried any weight loss meds, open to trying meds    Rudy Pope is a 27year old adult new to our office today for initiation of medical weight loss prog 10/10  Sleep hours and integrity: 6  Hours per night    MEDICAL HISTORY  PMH reviewed:   Cardiac disorders:negative   Depression/anxiety: anxiety/derpression   Glaucoma: negative  Kidney stones: negative  Eating disorder: +restriction eating disorder- curr adenopathy, no thyromegaly  LUNGS: CTA in all fields, breathing non labored  CARDIO: RRR without murmur  GI: +BS, soft, no masses, HSM or tenderness  EXTREMITIES: no cyanosis, no clubbing, no edema  NEURO: Oriented times three, full ROM of bilateral UE/LE total) by mouth 2 (two) times a day., Disp: 60 tablet, Rfl: 3  gabapentin 600 MG Oral Tab, Take 1 tablet (600 mg total) by mouth 3 (three) times daily. , Disp: 270 tablet, Rfl: 1  perphenazine 4 MG Oral Tab, Take 1 tablet (4 mg total) by mouth in the [facility] and hx of seizures and eating disorder)   · Body composition test results given   · Reviewed labs, baseline labs ordered  · Fatty liver, lifestyle education given  · premade meal options given and frozen meals at grocery store that she can    · pred

## 2021-08-04 NOTE — PATIENT INSTRUCTIONS
We are here to support you with weight loss, but please remember that you still need your primary care provider for your routine health maintenance.       PLAN:  Will start metformin 500mg (1 tablet with breakfast and 1 tablet with dinner)  Eating well- fro Goals should include:                  Lose 1.5-2 lbs per week                Activity level: not very active (can't count exercise towards calorie number per day)                   ** Daily INPUT> Look at nutrition section-- \"nutrients\" and it will break

## 2021-08-25 ENCOUNTER — HOSPITAL ENCOUNTER (OUTPATIENT)
Dept: NUCLEAR MEDICINE | Facility: HOSPITAL | Age: 26
Discharge: HOME | End: 2021-08-25
Attending: FAMILY MEDICINE
Payer: MEDICAID

## 2021-08-25 DIAGNOSIS — R10.11 RIGHT UPPER QUADRANT PAIN: ICD-10-CM

## 2021-08-25 PROCEDURE — 78227 HEPATOBIL SYST IMAGE W/DRUG: CPT | Performed by: FAMILY MEDICINE

## 2021-08-30 ENCOUNTER — HOSPITAL ENCOUNTER (OUTPATIENT)
Dept: ULTRASOUND IMAGING | Facility: HOSPITAL | Age: 26
Discharge: HOME | End: 2021-08-30
Attending: INTERNAL MEDICINE
Payer: MEDICAID

## 2021-08-30 DIAGNOSIS — R10.2 PELVIC PAIN: ICD-10-CM

## 2021-08-30 PROCEDURE — 76856 US EXAM PELVIC COMPLETE: CPT | Performed by: INTERNAL MEDICINE

## 2021-09-01 ENCOUNTER — OFFICE VISIT (OUTPATIENT)
Dept: SURGERY | Facility: CLINIC | Age: 26
End: 2021-09-01
Payer: COMMERCIAL

## 2021-09-01 ENCOUNTER — HOSPITAL ENCOUNTER (EMERGENCY)
Age: 26
Discharge: HOME | End: 2021-09-01
Attending: EMERGENCY MEDICINE
Payer: MEDICAID

## 2021-09-01 VITALS
WEIGHT: 201 LBS | BODY MASS INDEX: 28.77 KG/M2 | DIASTOLIC BLOOD PRESSURE: 71 MMHG | TEMPERATURE: 98 F | SYSTOLIC BLOOD PRESSURE: 105 MMHG | HEIGHT: 70 IN | HEART RATE: 86 BPM

## 2021-09-01 VITALS
SYSTOLIC BLOOD PRESSURE: 111 MMHG | RESPIRATION RATE: 20 BRPM | OXYGEN SATURATION: 98 % | HEART RATE: 80 BPM | DIASTOLIC BLOOD PRESSURE: 78 MMHG | TEMPERATURE: 98 F

## 2021-09-01 DIAGNOSIS — K82.8 BILIARY DYSKINESIA: Primary | ICD-10-CM

## 2021-09-01 DIAGNOSIS — S06.0X0A CONCUSSION WITHOUT LOSS OF CONSCIOUSNESS, INITIAL ENCOUNTER: Primary | ICD-10-CM

## 2021-09-01 DIAGNOSIS — Z01.818 PRE-OP TESTING: ICD-10-CM

## 2021-09-01 PROCEDURE — 3008F BODY MASS INDEX DOCD: CPT | Performed by: SURGERY

## 2021-09-01 PROCEDURE — 3078F DIAST BP <80 MM HG: CPT | Performed by: SURGERY

## 2021-09-01 PROCEDURE — 99213 OFFICE O/P EST LOW 20 MIN: CPT | Performed by: SURGERY

## 2021-09-01 PROCEDURE — 99283 EMERGENCY DEPT VISIT LOW MDM: CPT

## 2021-09-01 PROCEDURE — 3074F SYST BP LT 130 MM HG: CPT | Performed by: SURGERY
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
## 2021-09-02 NOTE — ED INITIAL ASSESSMENT (HPI)
Patient arrives from home with c/o hit head on car door x 3 \" I used to be a  and just wanted to see how my head would be, It was stupid\"  Now c/o dazed and dizzy

## 2021-09-02 NOTE — ED PROVIDER NOTES
Patient Seen in: THE [facility] In [place]      History   Patient presents with:  Head Injury    Stated Complaint: head injury on fri, unsure loc feels daze and dizzy    HPI/Subjective:   HPI    Patient is 27years-old, presenting for chace meals    • Vomiting blood    • Wears glasses    • Weight gain               Past Surgical History:   Procedure Laterality Date   • ADENOIDECTOMY     • BENIGN TUMOR EXC > 1.25 CM  back   • [address] alert, and oriented x3. Cranial nerves are grossly intact. There is no gross motor or sensory deficits identified. ED Course   Labs Reviewed - No data to display                MDM      Patient was placed on the cart and evaluated.     History and phys

## 2021-09-03 RX ORDER — ACETAMINOPHEN 500 MG
1000 TABLET ORAL ONCE
Status: CANCELLED | OUTPATIENT
Start: 2021-09-03 | End: 2021-09-03

## 2021-09-06 ENCOUNTER — LAB ENCOUNTER (OUTPATIENT)
Dept: LAB | Facility: HOSPITAL | Age: 26
End: 2021-09-06
Attending: SURGERY
Payer: MEDICAID

## 2021-09-06 ENCOUNTER — HOSPITAL ENCOUNTER (EMERGENCY)
Age: 26
Discharge: STILL ADMITTED OR ADMINISTRATIVE | End: 2021-09-06
Payer: MEDICAID

## 2021-09-06 DIAGNOSIS — Z01.818 PRE-OP TESTING: ICD-10-CM

## 2021-09-07 LAB — SARS-COV-2 RNA RESP QL NAA+PROBE: NOT DETECTED

## 2021-09-08 ENCOUNTER — OFFICE VISIT (OUTPATIENT)
Dept: INTERNAL MEDICINE CLINIC | Facility: CLINIC | Age: 26
End: 2021-09-08
Payer: COMMERCIAL

## 2021-09-08 VITALS
DIASTOLIC BLOOD PRESSURE: 70 MMHG | BODY MASS INDEX: 27.99 KG/M2 | SYSTOLIC BLOOD PRESSURE: 112 MMHG | RESPIRATION RATE: 17 BRPM | WEIGHT: 189 LBS | HEIGHT: 69 IN | OXYGEN SATURATION: 97 % | HEART RATE: 97 BPM

## 2021-09-08 DIAGNOSIS — E66.3 OVERWEIGHT (BMI 25.0-29.9): ICD-10-CM

## 2021-09-08 DIAGNOSIS — R73.03 PREDIABETES: ICD-10-CM

## 2021-09-08 DIAGNOSIS — K76.0 FATTY LIVER: ICD-10-CM

## 2021-09-08 DIAGNOSIS — Z51.81 THERAPEUTIC DRUG MONITORING: Primary | ICD-10-CM

## 2021-09-08 DIAGNOSIS — F33.2 SEVERE RECURRENT MAJOR DEPRESSION WITHOUT PSYCHOTIC FEATURES (HCC): ICD-10-CM

## 2021-09-08 PROCEDURE — 3008F BODY MASS INDEX DOCD: CPT | Performed by: NURSE PRACTITIONER

## 2021-09-08 PROCEDURE — 3074F SYST BP LT 130 MM HG: CPT | Performed by: NURSE PRACTITIONER

## 2021-09-08 PROCEDURE — 3078F DIAST BP <80 MM HG: CPT | Performed by: NURSE PRACTITIONER

## 2021-09-08 PROCEDURE — 99214 OFFICE O/P EST MOD 30 MIN: CPT | Performed by: NURSE PRACTITIONER

## 2021-09-08 RX ORDER — ALPRAZOLAM 1 MG/1
1 TABLET ORAL 2 TIMES DAILY PRN
COMMUNITY
Start: 2021-06-01 | End: 2021-10-04

## 2021-09-08 RX ORDER — HYDROCODONE BITARTRATE AND ACETAMINOPHEN 5; 325 MG/1; MG/1
TABLET ORAL
Status: ON HOLD | COMMUNITY
Start: 2021-07-30 | End: 2021-09-09

## 2021-09-08 NOTE — PROGRESS NOTES
HISTORY OF PRESENT ILLNESS  Patient presents with:  Weight Check: down 14    Pradepe Carter is a 27year old adult here for follow up with medical weight loss program for the treatment of overweight, obesity, or morbid obesity.      Down 14 lbs (first mon PERRLA  NECK: supple, no adenopathy  LUNGS: CTA in all fields, breathing non labored  CARDIO: RRR without murmur  GI: +BS, NT/ND, no masses or HSM  EXTREMITIES: no cyanosis, no clubbing, no edema  PSYCH: pleasant, cooperative, normal mood and affect    Lab Oral Tab, Take 1 tablet (2 mg total) by mouth 2 (two) times a day., Disp: 60 tablet, Rfl: 3  gabapentin 600 MG Oral Tab, Take 1 tablet (600 mg total) by mouth 3 (three) times daily. , Disp: 270 tablet, Rfl: 1  perphenazine 4 MG Oral Tab, Take 1 tablet (4 mg · Depression/anxiety, stable is seeing therapist /psych  · Encourage to try out other type of exercise    · Eating disorder- currently seeing intermission therapy (restricting eating)   · Nutrition: Low carb diet, recommended to eat breakfast daily/ regu

## 2021-09-08 NOTE — PATIENT INSTRUCTIONS
We are here to support you with weight loss, but please remember that you still need your primary care provider for your routine health maintenance.       PLAN:  Will continue with metformin 500mg   Follow up with me in 4 weeks  Schedule follow up appointme INPUT> Look at nutrition section-- \"nutrients\" and it will break down your macros for the day (ie. Protein, carbs, fibers, sugars and fats). Try to stay within these numbers daily     2.  \"7 minute workout\" to help with exercise/activity which takes 7 m

## 2021-09-09 ENCOUNTER — ANESTHESIA EVENT (OUTPATIENT)
Dept: SURGERY | Facility: HOSPITAL | Age: 26
End: 2021-09-09
Payer: MEDICAID

## 2021-09-09 ENCOUNTER — HOSPITAL ENCOUNTER (OUTPATIENT)
Facility: HOSPITAL | Age: 26
Setting detail: HOSPITAL OUTPATIENT SURGERY
Discharge: HOME | End: 2021-09-09
Attending: SURGERY | Admitting: SURGERY
Payer: MEDICAID

## 2021-09-09 ENCOUNTER — ANESTHESIA (OUTPATIENT)
Dept: SURGERY | Facility: HOSPITAL | Age: 26
End: 2021-09-09
Payer: MEDICAID

## 2021-09-09 ENCOUNTER — APPOINTMENT (OUTPATIENT)
Dept: GENERAL RADIOLOGY | Facility: HOSPITAL | Age: 26
End: 2021-09-09
Attending: SURGERY
Payer: MEDICAID

## 2021-09-09 VITALS
HEIGHT: 70 IN | TEMPERATURE: 97 F | WEIGHT: 198.44 LBS | SYSTOLIC BLOOD PRESSURE: 107 MMHG | DIASTOLIC BLOOD PRESSURE: 61 MMHG | OXYGEN SATURATION: 97 % | BODY MASS INDEX: 28.41 KG/M2 | HEART RATE: 66 BPM | RESPIRATION RATE: 16 BRPM

## 2021-09-09 DIAGNOSIS — K82.8 BILIARY DYSKINESIA: ICD-10-CM

## 2021-09-09 DIAGNOSIS — K80.20 CALCULUS OF GALLBLADDER WITHOUT CHOLECYSTITIS WITHOUT OBSTRUCTION: ICD-10-CM

## 2021-09-09 LAB
B-HCG UR QL: NEGATIVE
GLUCOSE BLD-MCNC: 112 MG/DL (ref 70–99)

## 2021-09-09 PROCEDURE — 82962 GLUCOSE BLOOD TEST: CPT

## 2021-09-09 PROCEDURE — BF121ZZ FLUOROSCOPY OF GALLBLADDER USING LOW OSMOLAR CONTRAST: ICD-10-PCS | Performed by: SURGERY

## 2021-09-09 PROCEDURE — 81025 URINE PREGNANCY TEST: CPT

## 2021-09-09 PROCEDURE — 0FT44ZZ RESECTION OF GALLBLADDER, PERCUTANEOUS ENDOSCOPIC APPROACH: ICD-10-PCS | Performed by: SURGERY

## 2021-09-09 PROCEDURE — 74300 X-RAY BILE DUCTS/PANCREAS: CPT | Performed by: SURGERY

## 2021-09-09 PROCEDURE — 88304 TISSUE EXAM BY PATHOLOGIST: CPT | Performed by: SURGERY

## 2021-09-09 RX ORDER — CEFOXITIN 2 G/1
INJECTION, POWDER, FOR SOLUTION INTRAVENOUS
Status: DISCONTINUED
Start: 2021-09-09 | End: 2021-09-09

## 2021-09-09 RX ORDER — SODIUM CHLORIDE, SODIUM LACTATE, POTASSIUM CHLORIDE, CALCIUM CHLORIDE 600; 310; 30; 20 MG/100ML; MG/100ML; MG/100ML; MG/100ML
INJECTION, SOLUTION INTRAVENOUS CONTINUOUS
Status: DISCONTINUED | OUTPATIENT
Start: 2021-09-09 | End: 2021-09-09

## 2021-09-09 RX ORDER — HEPARIN SODIUM 5000 [USP'U]/ML
INJECTION, SOLUTION INTRAVENOUS; SUBCUTANEOUS
Status: DISCONTINUED
Start: 2021-09-09 | End: 2021-09-09

## 2021-09-09 RX ORDER — DEXTROSE MONOHYDRATE 25 G/50ML
50 INJECTION, SOLUTION INTRAVENOUS
Status: DISCONTINUED | OUTPATIENT
Start: 2021-09-09 | End: 2021-09-09 | Stop reason: HOSPADM

## 2021-09-09 RX ORDER — ACETAMINOPHEN 500 MG
1000 TABLET ORAL ONCE AS NEEDED
Status: DISCONTINUED | OUTPATIENT
Start: 2021-09-09 | End: 2021-09-09

## 2021-09-09 RX ORDER — NALOXONE HYDROCHLORIDE 0.4 MG/ML
80 INJECTION, SOLUTION INTRAMUSCULAR; INTRAVENOUS; SUBCUTANEOUS AS NEEDED
Status: DISCONTINUED | OUTPATIENT
Start: 2021-09-09 | End: 2021-09-09

## 2021-09-09 RX ORDER — ONDANSETRON 2 MG/ML
4 INJECTION INTRAMUSCULAR; INTRAVENOUS AS NEEDED
Status: DISCONTINUED | OUTPATIENT
Start: 2021-09-09 | End: 2021-09-09

## 2021-09-09 RX ORDER — HYDROCODONE BITARTRATE AND ACETAMINOPHEN 5; 325 MG/1; MG/1
1 TABLET ORAL EVERY 6 HOURS PRN
Qty: 10 TABLET | Refills: 0 | Status: SHIPPED | OUTPATIENT
Start: 2021-09-09 | End: 2021-10-04

## 2021-09-09 RX ORDER — 0.9 % SODIUM CHLORIDE 0.9 %
INTRAVENOUS SOLUTION INTRAVENOUS
Status: DISCONTINUED
Start: 2021-09-09 | End: 2021-09-09

## 2021-09-09 RX ORDER — DEXAMETHASONE SODIUM PHOSPHATE 4 MG/ML
VIAL (ML) INJECTION AS NEEDED
Status: DISCONTINUED | OUTPATIENT
Start: 2021-09-09 | End: 2021-09-09 | Stop reason: SURG

## 2021-09-09 RX ORDER — ONDANSETRON 2 MG/ML
INJECTION INTRAMUSCULAR; INTRAVENOUS AS NEEDED
Status: DISCONTINUED | OUTPATIENT
Start: 2021-09-09 | End: 2021-09-09 | Stop reason: SURG

## 2021-09-09 RX ORDER — NEOSTIGMINE METHYLSULFATE 1 MG/ML
INJECTION INTRAVENOUS AS NEEDED
Status: DISCONTINUED | OUTPATIENT
Start: 2021-09-09 | End: 2021-09-09 | Stop reason: SURG

## 2021-09-09 RX ORDER — ACETAMINOPHEN 500 MG
1000 TABLET ORAL EVERY 6 HOURS PRN
Status: ON HOLD | COMMUNITY
End: 2021-09-09

## 2021-09-09 RX ORDER — HYDROMORPHONE HYDROCHLORIDE 1 MG/ML
0.4 INJECTION, SOLUTION INTRAMUSCULAR; INTRAVENOUS; SUBCUTANEOUS EVERY 5 MIN PRN
Status: DISCONTINUED | OUTPATIENT
Start: 2021-09-09 | End: 2021-09-09

## 2021-09-09 RX ORDER — ROCURONIUM BROMIDE 10 MG/ML
INJECTION, SOLUTION INTRAVENOUS AS NEEDED
Status: DISCONTINUED | OUTPATIENT
Start: 2021-09-09 | End: 2021-09-09 | Stop reason: SURG

## 2021-09-09 RX ORDER — GLYCOPYRROLATE 0.2 MG/ML
INJECTION, SOLUTION INTRAMUSCULAR; INTRAVENOUS AS NEEDED
Status: DISCONTINUED | OUTPATIENT
Start: 2021-09-09 | End: 2021-09-09 | Stop reason: SURG

## 2021-09-09 RX ORDER — MEPERIDINE HYDROCHLORIDE 25 MG/ML
12.5 INJECTION INTRAMUSCULAR; INTRAVENOUS; SUBCUTANEOUS AS NEEDED
Status: DISCONTINUED | OUTPATIENT
Start: 2021-09-09 | End: 2021-09-09

## 2021-09-09 RX ORDER — HYDROCODONE BITARTRATE AND ACETAMINOPHEN 5; 325 MG/1; MG/1
2 TABLET ORAL AS NEEDED
Status: COMPLETED | OUTPATIENT
Start: 2021-09-09 | End: 2021-09-09

## 2021-09-09 RX ORDER — HEPARIN SODIUM 5000 [USP'U]/ML
5000 INJECTION, SOLUTION INTRAVENOUS; SUBCUTANEOUS ONCE
Status: COMPLETED | OUTPATIENT
Start: 2021-09-09 | End: 2021-09-09

## 2021-09-09 RX ORDER — LIDOCAINE HYDROCHLORIDE 10 MG/ML
INJECTION, SOLUTION EPIDURAL; INFILTRATION; INTRACAUDAL; PERINEURAL AS NEEDED
Status: DISCONTINUED | OUTPATIENT
Start: 2021-09-09 | End: 2021-09-09 | Stop reason: SURG

## 2021-09-09 RX ORDER — MIDAZOLAM HYDROCHLORIDE 1 MG/ML
1 INJECTION INTRAMUSCULAR; INTRAVENOUS EVERY 5 MIN PRN
Status: DISCONTINUED | OUTPATIENT
Start: 2021-09-09 | End: 2021-09-09

## 2021-09-09 RX ORDER — KETOROLAC TROMETHAMINE 30 MG/ML
INJECTION, SOLUTION INTRAMUSCULAR; INTRAVENOUS AS NEEDED
Status: DISCONTINUED | OUTPATIENT
Start: 2021-09-09 | End: 2021-09-09 | Stop reason: SURG

## 2021-09-09 RX ORDER — DIPHENHYDRAMINE HYDROCHLORIDE 50 MG/ML
12.5 INJECTION INTRAMUSCULAR; INTRAVENOUS AS NEEDED
Status: DISCONTINUED | OUTPATIENT
Start: 2021-09-09 | End: 2021-09-09

## 2021-09-09 RX ORDER — HYDROCODONE BITARTRATE AND ACETAMINOPHEN 5; 325 MG/1; MG/1
1 TABLET ORAL AS NEEDED
Status: COMPLETED | OUTPATIENT
Start: 2021-09-09 | End: 2021-09-09

## 2021-09-09 RX ORDER — BUPIVACAINE HYDROCHLORIDE AND EPINEPHRINE 5; 5 MG/ML; UG/ML
INJECTION, SOLUTION EPIDURAL; INTRACAUDAL; PERINEURAL AS NEEDED
Status: DISCONTINUED | OUTPATIENT
Start: 2021-09-09 | End: 2021-09-09 | Stop reason: HOSPADM

## 2021-09-09 RX ADMIN — SODIUM CHLORIDE, SODIUM LACTATE, POTASSIUM CHLORIDE, CALCIUM CHLORIDE: 600; 310; 30; 20 INJECTION, SOLUTION INTRAVENOUS at 09:54:00

## 2021-09-09 RX ADMIN — LIDOCAINE HYDROCHLORIDE 50 MG: 10 INJECTION, SOLUTION EPIDURAL; INFILTRATION; INTRACAUDAL; PERINEURAL at 09:08:00

## 2021-09-09 RX ADMIN — SODIUM CHLORIDE, SODIUM LACTATE, POTASSIUM CHLORIDE, CALCIUM CHLORIDE: 600; 310; 30; 20 INJECTION, SOLUTION INTRAVENOUS at 08:59:00

## 2021-09-09 RX ADMIN — NEOSTIGMINE METHYLSULFATE 3 MG: 1 INJECTION INTRAVENOUS at 09:54:00

## 2021-09-09 RX ADMIN — KETOROLAC TROMETHAMINE 30 MG: 30 INJECTION, SOLUTION INTRAMUSCULAR; INTRAVENOUS at 09:54:00

## 2021-09-09 RX ADMIN — DEXAMETHASONE SODIUM PHOSPHATE 8 MG: 4 MG/ML VIAL (ML) INJECTION at 09:08:00

## 2021-09-09 RX ADMIN — ONDANSETRON 4 MG: 2 INJECTION INTRAMUSCULAR; INTRAVENOUS at 09:08:00

## 2021-09-09 RX ADMIN — GLYCOPYRROLATE 0.6 MG: 0.2 INJECTION, SOLUTION INTRAMUSCULAR; INTRAVENOUS at 09:54:00

## 2021-09-09 RX ADMIN — GLYCOPYRROLATE 0.4 MG: 0.2 INJECTION, SOLUTION INTRAMUSCULAR; INTRAVENOUS at 09:36:00

## 2021-09-09 RX ADMIN — ROCURONIUM BROMIDE 40 MG: 10 INJECTION, SOLUTION INTRAVENOUS at 09:08:00

## 2021-09-09 NOTE — ANESTHESIA PREPROCEDURE EVALUATION
PRE-OP EVALUATION    Patient Name: Leonarda López    Admit Diagnosis: Calculus of gallbladder without cholecystitis without obstruction [K80.20]    Pre-op Diagnosis: Calculus of gallbladder without cholecystitis without obstruction [K80.20]    LAPAROSCOP 9/6/2021  traZODone 100 MG Oral Tab, Take 2 tablets at bedtime as needed for sleep, Disp: 180 tablet, Rfl: 1, 9/8/2021 at 2100  clonazePAM 2 MG Oral Tab, Take 1 tablet (2 mg total) by mouth 2 (two) times a day., Disp: 60 tablet, Rfl: 3, 9/8/2021 at 2100  g Neuro/Psych      (+) depression  (+) anxiety             Psychiatric history: borderline   ADD.                Past Surgical History:   Procedure Laterality Date   • ADENOIDECTOMY     • BENIGN TUMOR EXC > 1.25 CM  back   • REMV PILONIDAL LESION SIMPLE

## 2021-09-09 NOTE — ANESTHESIA POSTPROCEDURE EVALUATION
[address] [facility] Patient Status:  Hospital Outpatient Surgery   Age/Gender 27year old adult MRN [id number]   Location [address] [facility] Attending Trell Hammer MD   [address] [facility] Day # 0 PCP Orquidea Delarosa DO

## 2021-09-09 NOTE — ANESTHESIA PROCEDURE NOTES
Airway  Date/Time: 9/9/2021 9:11 AM  Urgency: elective      General Information and Staff    Patient location during procedure: OR  Anesthesiologist: Aileen Ribeiro MD  Performed: anesthesiologist     Indications and Patient Condition  Indications for a

## 2021-09-09 NOTE — OPERATIVE REPORT
[facility]  Op Note    Brenda Eaglerobcharlie Location: OR   [facility] [id number] MRN [id number]   Admission Date 9/9/2021 Operation Date 9/9/2021   Attending Physician Ping Hernandez MD Operating Physician Tammy Sommers MD   DATE OF OPERATION:  9/9/202 and a curvilinear incision was made superior to it with an 11-blade scalpel. Veress needle was passed into the abdomen, and pneumoperitoneum was instituted without difficulty. The 11 mm trocar was passed through this incision site.  The abdomen was inspecte incisions to help with postoperative pain control. Steri-Strips and Mastisol were placed across the incisions. The patient tolerated the procedure well, was extubated in the OR, and went to the PACU in good condition.     Tammie Boogie MD

## 2021-09-09 NOTE — INTERVAL H&P NOTE
Pre-op Diagnosis: Calculus of gallbladder without cholecystitis without obstruction [K80.20]    The above referenced H&P was reviewed by Matias Murphy MD on 9/9/2021, the patient was examined and no significant changes have occurred in the patient's

## 2021-09-10 ENCOUNTER — OFFICE VISIT (OUTPATIENT)
Dept: SURGERY | Facility: CLINIC | Age: 26
End: 2021-09-10

## 2021-09-10 VITALS — TEMPERATURE: 98 F | BODY MASS INDEX: 28.35 KG/M2 | HEIGHT: 70 IN | WEIGHT: 198 LBS

## 2021-09-10 DIAGNOSIS — K82.8 GALLBLADDER SLUDGE: Primary | ICD-10-CM

## 2021-09-10 DIAGNOSIS — Z90.49 S/P LAPAROSCOPIC CHOLECYSTECTOMY: ICD-10-CM

## 2021-09-10 PROCEDURE — 3008F BODY MASS INDEX DOCD: CPT | Performed by: PHYSICIAN ASSISTANT

## 2021-09-10 PROCEDURE — 99024 POSTOP FOLLOW-UP VISIT: CPT | Performed by: PHYSICIAN ASSISTANT

## 2021-09-10 NOTE — PROGRESS NOTES
Post Operative Visit Note       Active Problems  1. Gallbladder sludge    2. S/P laparoscopic cholecystectomy         Chief Complaint   Patient presents with:  Post-Op: PO 9/9 lap zachary w. [facility]- PT states has drainage and bleeding from site.  PT states has n • History of eating disorder    • History of mental disorder    • IBS (irritable colon syndrome)    • Indigestion    • Irregular bowel habits    • Loss of appetite    • Migraines    • Nausea    • Night sweats    • Pain in joints    • Pain with bowel mov Use      Smoking status: Current Every Day Smoker        Packs/day: 0.50        Years: 1.00        Pack years: .5        Types: Cigarettes      Smokeless tobacco: Never Used    Vaping Use      Vaping Use: Never used    Substance and Sexual Activity      Al Omeprazole 40 MG Oral Capsule Delayed Release, Take 1 capsule by mouth daily. , Disp: , Rfl:   •  SUMAtriptan Succinate 50 MG Oral Tab, Take 50 mg by mouth every 2 (two) hours as needed.   , Disp: , Rfl:       Review of Systems  The Review of Systems has b There is no abdominal tenderness. There is no guarding or rebound. Comments: Abdomen is nondistended, without tympany to percussion. Soft, expected incisional tenderness. No rebound or guarding. No peritoneal signs.     Laparoscopic incision sit appointment. · Continue pain control as needed. · Pathology unavailable at today's visit. This will be reviewed on 9/20. · This plan was discussed with the patient. All of the patient's questions were answered.   He has expressed his understanding an

## 2021-09-20 ENCOUNTER — OFFICE VISIT (OUTPATIENT)
Dept: SURGERY | Facility: CLINIC | Age: 26
End: 2021-09-20

## 2021-09-20 VITALS
TEMPERATURE: 98 F | BODY MASS INDEX: 27.99 KG/M2 | HEIGHT: 69 IN | WEIGHT: 189 LBS | HEART RATE: 92 BPM | DIASTOLIC BLOOD PRESSURE: 74 MMHG | SYSTOLIC BLOOD PRESSURE: 117 MMHG

## 2021-09-20 DIAGNOSIS — K82.8 GALLBLADDER SLUDGE: Primary | ICD-10-CM

## 2021-09-20 DIAGNOSIS — Z09 FOLLOW UP: ICD-10-CM

## 2021-09-20 DIAGNOSIS — Z90.49 S/P LAPAROSCOPIC CHOLECYSTECTOMY: ICD-10-CM

## 2021-09-20 PROCEDURE — 3074F SYST BP LT 130 MM HG: CPT | Performed by: PHYSICIAN ASSISTANT

## 2021-09-20 PROCEDURE — 99024 POSTOP FOLLOW-UP VISIT: CPT | Performed by: PHYSICIAN ASSISTANT

## 2021-09-20 PROCEDURE — 3008F BODY MASS INDEX DOCD: CPT | Performed by: PHYSICIAN ASSISTANT

## 2021-09-20 PROCEDURE — 3078F DIAST BP <80 MM HG: CPT | Performed by: PHYSICIAN ASSISTANT

## 2021-09-20 NOTE — ED INITIAL ASSESSMENT (HPI)
Patient presents with a panic attack stating medication may have been laced with some other drug. States experiencing very odd feelings, sweating profusely and crying.

## 2021-09-20 NOTE — ED PROVIDER NOTES
Patient Seen in: THE [facility] Emergency Department In [place]      History   Patient presents with:  Medication Reaction    Stated Complaint: Patient presents with a panic attack stating medication may have been laced wit*    Subjective:   HPI    Patient is pain    • Stool incontinence    • Stress    • Syncope    • Uncomfortable fullness after meals    • Visual impairment     glasses   • Vomiting blood    • Wears glasses    • Weight gain               Past Surgical History:   Procedure Laterality Date   • DELMY Palpations: Abdomen is soft. Musculoskeletal:         General: Normal range of motion. Cervical back: Normal range of motion and neck supple. Skin:     General: Skin is warm and dry.    Neurological:      Mental Status: He is alert and oriented to changes. No old available for comparison. MDM      20-year-old individual presenting with symptoms suspicious for anxiety attack. Started after morning medications which are not new and have persisted all day.   Patient looks quite a

## 2021-09-20 NOTE — PROGRESS NOTES
Follow Up Visit Note       Active Problems      1. Gallbladder sludge    2. S/P laparoscopic cholecystectomy    3. Follow up          Chief Complaint   Patient presents with:  Post-Op: post op lap zachary w Collin Loredo on 9/9/21. pt is c/o slight nausea.  slight d 7years old   • Headache disorder    • Hearing loss    • Heartburn    • High blood pressure    • High cholesterol    • History of depression    • History of eating disorder    • History of mental disorder    • IBS (irritable colon syndrome)    • Indigestio Smoking status: Current Every Day Smoker        Packs/day: 0.50        Years: 1.00        Pack years: .5        Types: Cigarettes      Smokeless tobacco: Never Used    Vaping Use      Vaping Use: Never used    Substance and Sexual Activity      Alcohol u Capsule Delayed Release, Take 1 capsule by mouth daily. , Disp: , Rfl:   •  SUMAtriptan Succinate 50 MG Oral Tab, Take 50 mg by mouth every 2 (two) hours as needed.   , Disp: , Rfl:      Review of Systems  The Review of Systems has been reviewed by janeen Hall Mental Status: He is alert and oriented to person, place, and time. Coordination: Coordination normal.   Psychiatric:         Mood and Affect: Mood normal.         Behavior: Behavior normal.         Thought Content:  Thought content normal.         Erik Ray

## 2021-09-28 PROBLEM — F33.3 MDD (MAJOR DEPRESSIVE DISORDER), RECURRENT, SEVERE, WITH PSYCHOSIS (HCC): Status: ACTIVE | Noted: 2021-09-28

## 2021-09-29 PROBLEM — F43.10 PTSD (POST-TRAUMATIC STRESS DISORDER): Status: ACTIVE | Noted: 2021-09-29

## 2021-10-20 ENCOUNTER — OFFICE VISIT (OUTPATIENT)
Dept: INTERNAL MEDICINE CLINIC | Facility: CLINIC | Age: 26
End: 2021-10-20
Payer: MEDICAID

## 2021-10-20 VITALS
DIASTOLIC BLOOD PRESSURE: 78 MMHG | SYSTOLIC BLOOD PRESSURE: 124 MMHG | BODY MASS INDEX: 27.4 KG/M2 | RESPIRATION RATE: 16 BRPM | WEIGHT: 185 LBS | HEIGHT: 69 IN | HEART RATE: 88 BPM

## 2021-10-20 DIAGNOSIS — Z51.81 THERAPEUTIC DRUG MONITORING: Primary | ICD-10-CM

## 2021-10-20 DIAGNOSIS — R73.03 PREDIABETES: ICD-10-CM

## 2021-10-20 DIAGNOSIS — K76.0 FATTY LIVER: ICD-10-CM

## 2021-10-20 DIAGNOSIS — E66.3 OVERWEIGHT (BMI 25.0-29.9): ICD-10-CM

## 2021-10-20 DIAGNOSIS — F50.9 EATING DISORDER, UNSPECIFIED TYPE: ICD-10-CM

## 2021-10-20 PROCEDURE — 3008F BODY MASS INDEX DOCD: CPT | Performed by: NURSE PRACTITIONER

## 2021-10-20 PROCEDURE — 3074F SYST BP LT 130 MM HG: CPT | Performed by: NURSE PRACTITIONER

## 2021-10-20 PROCEDURE — 99214 OFFICE O/P EST MOD 30 MIN: CPT | Performed by: NURSE PRACTITIONER

## 2021-10-20 PROCEDURE — 3078F DIAST BP <80 MM HG: CPT | Performed by: NURSE PRACTITIONER

## 2021-10-20 NOTE — PATIENT INSTRUCTIONS
We are here to support you with weight loss, but please remember that you still need your primary care provider for your routine health maintenance.       PLAN:  Will hold metformin until you are eating  Need to eat, talk with therapist today about it  Jeanna Clarke very active (can't count exercise towards calorie number per day)                   ** Daily INPUT> Look at nutrition section-- \"nutrients\" and it will break down your macros for the day (ie. Protein, carbs, fibers, sugars and fats).  Try to stay within t

## 2021-10-20 NOTE — PROGRESS NOTES
HISTORY OF PRESENT ILLNESS  Patient presents with:  Weight Check: down 4 lb    Rudy Pope is a 27year old adult here for follow up with medical weight loss program for the treatment of overweight, obesity, or morbid obesity.      Down 4 lbs  Non-comp kg)   LMP 01/10/2021 (Exact Date)   BMI 27.32 kg/m²       GENERAL: well developed, well nourished, in no apparent distress, A/O x3  SKIN: no rashes, no suspicious lesions  HEENT: conjunctiva pink, sclera non icteric, PERRLA  NECK: supple, no adenopathy  ZAY mouth 2 (two) times daily. , Disp: 60 tablet, Rfl: 0  Lisdexamfetamine Dimesylate 50 MG Oral Cap, Take 1 capsule (50 mg total) by mouth every morning., Disp: 30 capsule, Rfl: 0  topiramate 50 MG Oral Tab, Take 1 tablet (50 mg total) by mouth at bedtime. Criss Vasquez eating  · Depression/anxiety, not stable- recently inpatient, see HPI,  is seeing therapist /psych today  · Encourage to try out other type of exercise    · Eating disorder- currently seeing intermission therapy (restricting eating)   · Nutrition: Low carb

## 2021-11-03 ENCOUNTER — HOSPITAL ENCOUNTER (EMERGENCY)
Facility: HOSPITAL | Age: 26
Discharge: HOME | End: 2021-11-04
Attending: STUDENT IN AN ORGANIZED HEALTH CARE EDUCATION/TRAINING PROGRAM
Payer: MEDICAID

## 2021-11-03 DIAGNOSIS — T50.901A ACCIDENTAL DRUG OVERDOSE, INITIAL ENCOUNTER: Primary | ICD-10-CM

## 2021-11-03 PROCEDURE — 80053 COMPREHEN METABOLIC PANEL: CPT | Performed by: STUDENT IN AN ORGANIZED HEALTH CARE EDUCATION/TRAINING PROGRAM

## 2021-11-03 PROCEDURE — 82962 GLUCOSE BLOOD TEST: CPT

## 2021-11-03 PROCEDURE — 81001 URINALYSIS AUTO W/SCOPE: CPT | Performed by: STUDENT IN AN ORGANIZED HEALTH CARE EDUCATION/TRAINING PROGRAM

## 2021-11-03 PROCEDURE — 80307 DRUG TEST PRSMV CHEM ANLYZR: CPT | Performed by: STUDENT IN AN ORGANIZED HEALTH CARE EDUCATION/TRAINING PROGRAM

## 2021-11-03 PROCEDURE — 93005 ELECTROCARDIOGRAM TRACING: CPT

## 2021-11-03 PROCEDURE — 99284 EMERGENCY DEPT VISIT MOD MDM: CPT

## 2021-11-03 PROCEDURE — 80143 DRUG ASSAY ACETAMINOPHEN: CPT | Performed by: STUDENT IN AN ORGANIZED HEALTH CARE EDUCATION/TRAINING PROGRAM

## 2021-11-03 PROCEDURE — 87086 URINE CULTURE/COLONY COUNT: CPT | Performed by: STUDENT IN AN ORGANIZED HEALTH CARE EDUCATION/TRAINING PROGRAM

## 2021-11-03 PROCEDURE — 80179 DRUG ASSAY SALICYLATE: CPT | Performed by: STUDENT IN AN ORGANIZED HEALTH CARE EDUCATION/TRAINING PROGRAM

## 2021-11-03 PROCEDURE — 36415 COLL VENOUS BLD VENIPUNCTURE: CPT

## 2021-11-03 PROCEDURE — 85025 COMPLETE CBC W/AUTO DIFF WBC: CPT | Performed by: STUDENT IN AN ORGANIZED HEALTH CARE EDUCATION/TRAINING PROGRAM

## 2021-11-03 PROCEDURE — 87077 CULTURE AEROBIC IDENTIFY: CPT | Performed by: STUDENT IN AN ORGANIZED HEALTH CARE EDUCATION/TRAINING PROGRAM

## 2021-11-03 PROCEDURE — 99285 EMERGENCY DEPT VISIT HI MDM: CPT

## 2021-11-03 PROCEDURE — 82077 ASSAY SPEC XCP UR&BREATH IA: CPT | Performed by: STUDENT IN AN ORGANIZED HEALTH CARE EDUCATION/TRAINING PROGRAM

## 2021-11-03 PROCEDURE — 81025 URINE PREGNANCY TEST: CPT

## 2021-11-03 PROCEDURE — 93010 ELECTROCARDIOGRAM REPORT: CPT

## 2021-11-04 VITALS
TEMPERATURE: 98 F | DIASTOLIC BLOOD PRESSURE: 81 MMHG | HEART RATE: 65 BPM | OXYGEN SATURATION: 100 % | SYSTOLIC BLOOD PRESSURE: 112 MMHG | RESPIRATION RATE: 15 BRPM
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
## 2021-11-04 NOTE — ED QUICK NOTES
Pt awake and alert. Comfortable. For discharge.  Verbalizing understanding of discharge instructions given

## 2021-12-03 ENCOUNTER — HOSPITAL ENCOUNTER (EMERGENCY)
Facility: HOSPITAL | Age: 26
Discharge: HOME | End: 2021-12-03
Attending: EMERGENCY MEDICINE
Payer: COMMERCIAL

## 2021-12-03 VITALS
SYSTOLIC BLOOD PRESSURE: 125 MMHG | TEMPERATURE: 98 F | DIASTOLIC BLOOD PRESSURE: 78 MMHG | HEART RATE: 78 BPM | OXYGEN SATURATION: 95 % | RESPIRATION RATE: 20 BRPM

## 2021-12-03 DIAGNOSIS — L01.00 IMPETIGO: ICD-10-CM

## 2021-12-03 DIAGNOSIS — R00.2 PALPITATIONS: ICD-10-CM

## 2021-12-03 DIAGNOSIS — N30.00 ACUTE CYSTITIS WITHOUT HEMATURIA: Primary | ICD-10-CM

## 2021-12-03 PROCEDURE — 85025 COMPLETE CBC W/AUTO DIFF WBC: CPT | Performed by: EMERGENCY MEDICINE

## 2021-12-03 PROCEDURE — 87086 URINE CULTURE/COLONY COUNT: CPT | Performed by: EMERGENCY MEDICINE

## 2021-12-03 PROCEDURE — 99284 EMERGENCY DEPT VISIT MOD MDM: CPT

## 2021-12-03 PROCEDURE — 81001 URINALYSIS AUTO W/SCOPE: CPT | Performed by: EMERGENCY MEDICINE

## 2021-12-03 PROCEDURE — 84443 ASSAY THYROID STIM HORMONE: CPT | Performed by: EMERGENCY MEDICINE

## 2021-12-03 PROCEDURE — 93005 ELECTROCARDIOGRAM TRACING: CPT

## 2021-12-03 PROCEDURE — 81025 URINE PREGNANCY TEST: CPT

## 2021-12-03 PROCEDURE — 83735 ASSAY OF MAGNESIUM: CPT | Performed by: EMERGENCY MEDICINE

## 2021-12-03 PROCEDURE — 93010 ELECTROCARDIOGRAM REPORT: CPT

## 2021-12-03 PROCEDURE — 96360 HYDRATION IV INFUSION INIT: CPT

## 2021-12-03 PROCEDURE — 80053 COMPREHEN METABOLIC PANEL: CPT | Performed by: EMERGENCY MEDICINE

## 2021-12-03 RX ORDER — CEFADROXIL 500 MG/1
500 CAPSULE ORAL 2 TIMES DAILY
Qty: 6 CAPSULE | Refills: 0 | Status: SHIPPED | OUTPATIENT
Start: 2021-12-04 | End: 2021-12-03

## 2021-12-03 RX ORDER — CEPHALEXIN 500 MG/1
500 CAPSULE ORAL ONCE
Status: COMPLETED | OUTPATIENT
Start: 2021-12-03 | End: 2021-12-03

## 2021-12-03 RX ORDER — CEFADROXIL 500 MG/1
500 CAPSULE ORAL 2 TIMES DAILY
Qty: 20 CAPSULE | Refills: 0 | Status: SHIPPED | OUTPATIENT
Start: 2021-12-03 | End: 2021-12-13

## 2021-12-03 NOTE — ED INITIAL ASSESSMENT (HPI)
Pt to the ED with palpitations and rapid heart rate that started x3 days ago. Pt states that he began thinking that it was his anxiety but when the anxiety went away the heart rate stayed up.  Pt reports trying calming techniques to lower HR with no improve

## 2021-12-03 NOTE — ED QUICK NOTES
Discussed case with dr Moon Moreira. Pt is here on this visit to eval palpitations   And cp.   Patient re assessed by md and rn present pt states he was hospitalized a few weeks ago ,so he answered yes to having thoughts in the past 30 days because of his inpat

## 2021-12-03 NOTE — ED PROVIDER NOTES
Patient Seen in: [facility] Emergency Department      History   Patient presents with:  Eval-P    Stated Complaint: tachycardia    Subjective:   HPI    57-year-old transgender female to male presents to the emergency department complaining of a 2-da cyst without mention of abscess    • Pneumonia due to organism    • PONV (postoperative nausea and vomiting)    • Prediabetes    • Seizure disorder (HCC)     last seizure was at age 4 or 3   • Sleep disturbance    • Stomach pain    • Stool incontinence rashes or lesions. Neuro exam: Awake, conversive and moving all 4 extremities well.     ED Course     Labs Reviewed   URINALYSIS WITH CULTURE REFLEX - Abnormal; Notable for the following components:       Result Value    Urine Color Airam (*)     Clarity Ur rate. Patient was agreeable to the treatment plan. Initial dose of antibiotic was given for evidence of a cystitis. Patient also asked me to look at a tattoo which is new on the left arm which she has some egan crusting consistent with impetigo.  There

## 2021-12-06 ENCOUNTER — HOSPITAL ENCOUNTER (OUTPATIENT)
Dept: CV DIAGNOSTICS | Facility: HOSPITAL | Age: 26
Discharge: HOME | End: 2021-12-06
Attending: EMERGENCY MEDICINE
Payer: COMMERCIAL

## 2021-12-06 DIAGNOSIS — R00.2 PALPITATIONS: ICD-10-CM

## 2021-12-06 PROCEDURE — 93226 XTRNL ECG REC<48 HR SCAN A/R: CPT | Performed by: EMERGENCY MEDICINE

## 2021-12-06 PROCEDURE — 93225 XTRNL ECG REC<48 HRS REC: CPT | Performed by: EMERGENCY MEDICINE

## 2021-12-06 PROCEDURE — 93227 XTRNL ECG REC<48 HR R&I: CPT | Performed by: EMERGENCY MEDICINE

## 2021-12-15 ENCOUNTER — OFFICE VISIT (OUTPATIENT)
Dept: INTERNAL MEDICINE CLINIC | Facility: CLINIC | Age: 26
End: 2021-12-15
Payer: COMMERCIAL

## 2021-12-15 VITALS
WEIGHT: 181 LBS | OXYGEN SATURATION: 98 % | HEIGHT: 69 IN | RESPIRATION RATE: 16 BRPM | HEART RATE: 95 BPM | BODY MASS INDEX: 26.81 KG/M2 | SYSTOLIC BLOOD PRESSURE: 100 MMHG | DIASTOLIC BLOOD PRESSURE: 70 MMHG

## 2021-12-15 DIAGNOSIS — K76.0 FATTY LIVER: ICD-10-CM

## 2021-12-15 DIAGNOSIS — E66.3 OVERWEIGHT (BMI 25.0-29.9): ICD-10-CM

## 2021-12-15 DIAGNOSIS — Z51.81 THERAPEUTIC DRUG MONITORING: Primary | ICD-10-CM

## 2021-12-15 DIAGNOSIS — F50.9 EATING DISORDER, UNSPECIFIED TYPE: ICD-10-CM

## 2021-12-15 DIAGNOSIS — R73.03 PREDIABETES: ICD-10-CM

## 2021-12-15 DIAGNOSIS — F33.2 SEVERE RECURRENT MAJOR DEPRESSION WITHOUT PSYCHOTIC FEATURES (HCC): ICD-10-CM

## 2021-12-15 PROCEDURE — 3078F DIAST BP <80 MM HG: CPT | Performed by: NURSE PRACTITIONER

## 2021-12-15 PROCEDURE — 99213 OFFICE O/P EST LOW 20 MIN: CPT | Performed by: NURSE PRACTITIONER

## 2021-12-15 PROCEDURE — 3074F SYST BP LT 130 MM HG: CPT | Performed by: NURSE PRACTITIONER

## 2021-12-15 PROCEDURE — 3008F BODY MASS INDEX DOCD: CPT | Performed by: NURSE PRACTITIONER

## 2021-12-15 RX ORDER — SYRINGE WITH NEEDLE, 1 ML 25GX5/8"
SYRINGE, EMPTY DISPOSABLE MISCELLANEOUS
COMMUNITY
Start: 2021-09-17 | End: 2021-12-17

## 2021-12-15 NOTE — PROGRESS NOTES
HISTORY OF PRESENT ILLNESS  Patient presents with:  Weight Check: down 4    Kathy De Leon is a 27year old adult here for follow up with medical weight loss program for the treatment of overweight, obesity, or morbid obesity.      Down 4 lbs  Is currentl breathing non labored  CARDIO: RRR without murmur  GI: +BS, NT/ND, no masses or HSM  EXTREMITIES: no cyanosis, no clubbing, no edema  PSYCH: no si./hi     Lab Results   Component Value Date    GLU 86 12/03/2021    BUN 10 12/03/2021    BUNCREA 6.7 (L) 05/22 Pt. Administers medication on Sunday. , Disp: , Rfl:   Omeprazole 40 MG Oral Capsule Delayed Release, Take 1 capsule by mouth daily.   , Disp: , Rfl:   BD LUER-CASEY SYRINGE 20G X 1\" 3 ML Does not apply Misc, USE AS DIRECTED TO DRAW UP MEDICATIONS, Disp: , Rf 7/2021- will hold metformin (since not eating)  · Depression/anxiety, somewhat stable, is currently seeing therapist /psych today  · Eating disorder- currently seeing intermission therapy (restricting eating)   · Long education done on healthy eating, can'

## 2021-12-15 NOTE — PATIENT INSTRUCTIONS
We are here to support you with weight loss, but please remember that you still need your primary care provider for your routine health maintenance.       PLAN:  Try out food pantry- loafs and fishes  Follow up with me in 10 weeks  Schedule follow up appoin INPUT> Look at nutrition section-- \"nutrients\" and it will break down your macros for the day (ie. Protein, carbs, fibers, sugars and fats). Try to stay within these numbers daily     2.  \"7 minute workout\" to help with exercise/activity which takes 7 m

## 2021-12-17 PROBLEM — F33.2 SEVERE EPISODE OF RECURRENT MAJOR DEPRESSIVE DISORDER, WITHOUT PSYCHOTIC FEATURES (HCC): Status: ACTIVE | Noted: 2018-09-30

## 2021-12-17 NOTE — ED NOTES
This writer received an incoming phone call from [facility].  This writer informed [place] that a repeat EKG will be faxed over, which shows a normal sinus rhythm for patient.     This writer transferred call to nursing for nurse to

## 2021-12-17 NOTE — ED NOTES
This writer contacted [facility] regarding status of bed request. This writer spoke with Tiffanie Alvarez; who is concerned about patients EDP since it is not their forte. Patients packet is still under review.

## 2021-12-17 NOTE — ED NOTES
This writer received an incoming call from [facility]. Patient deflected d/t no appropriate bed for Eating disorder.

## 2021-12-17 NOTE — ED NOTES
This writer contacted [facility] [employer].  This writer confirmed that a rapid is acceptable for COVID 19 retesting.

## 2021-12-17 NOTE — CONSULTS
[facility]  Report of Consultation    Leonarda López Patient Status:  Emergency    1995 MRN [id number]   Location [address] [facility] Attending Lukas Licea MD   Hosp Day # 0 PCP Frances Schilling DO     Writer spoke wi node    • Fatigue    • Feeling lonely    • Fever    • Flatulence/gas pain/belching    • Frequent use of laxatives    • Frequent UTI    • H/O idiopathic seizure     childhood only - last seizure 7years old   • Headache disorder    • Hearing loss    • Heart and Other Disorders Associated Other    • Substance Abuse Other       reports that he has been smoking cigarettes. He has a 0.50 pack-year smoking history. He has never used smokeless tobacco. He reports previous alcohol use. Recently took old xanax pills. intact immediate, recent, remote and as evidenced by ability to present consistent history  Insight: impaired as evidenced by minimizing recent events, severity of symptoms   Judgment: impaired as evidenced by recent behaviors       Laboratory Data:  Lab

## 2021-12-17 NOTE — ED QUICK NOTES
Pt on phone with Lukasz Villanueva" from [facility], according to Caller ID on desk phone. Ondina Winter wishing to talk to patient regarding \"extremely urgent matter\" that Ondina Winter is not willing to share with this RN.  Per Allie, she was talking with naresh

## 2021-12-17 NOTE — ED NOTES
TRANSFER SUMMARY:    [facility]:  Faxed packet for review. [facility]: Packet under review awaiting repeat EKG & repeat COVID19 tests. [facility]:  Patient deflected d/t no appropriate bed. [facility]:  Patient deflected no blocked rooms.

## 2021-12-17 NOTE — ED NOTES
Placed orders for ED potential boarder. Patient to be scheduled for evening telepsychiatry; awaiting return phone call.

## 2021-12-17 NOTE — ED NOTES
This writer received an incoming phone call from [facility]. Accepting Dr. Lenin Cooper. Transport to be set up at any time.

## 2021-12-17 NOTE — CERTIFICATION
Ref: [id number] 5/3-403, 5/3-602, 5/3-607, 5/3-610    5/3-702, 5/3-813, 5/4-306, 5/4-402, 5/4-403    5/4-405, 5/4-501, 5/4-611, 9/9-835   Inpatient Certificate  Re: Olive Paget    (name)     I personally informed the above-named individual of the pur behavioral history, to suffer mental or emotional deterioration and is reasonably expected, after such deterioration, to meet the criteria of either paragraph one or paragraph two above;   []  An individual who is developmentally disabled and unless treate

## 2021-12-17 NOTE — ED NOTES
This writer faxed the following to [phone]:    · Updated Certificate  · Repeat EKG w/normal sinus rhythm.

## 2021-12-17 NOTE — ED NOTES
This writer [facility]   Nurse reviewing chart requesting repeat EKG & repeat COVID19 test.  COVID 19 test requested d/t last test yesterday. Dillon Gopigoldendaniela Butch wants COVID 19 test the same day.       If patients do

## 2021-12-17 NOTE — ED NOTES
This writer contacted Doreen Beckford of [address] [place] [address] regarding status of bed request.  This writer was informed that she just took a patient in for an assessment.
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
## 2021-12-17 NOTE — ED PROVIDER NOTES
Martinez Cagle is a 47-year-old transgendered male, who ingested approximately 2 mg of Xanax, 7 mg of Haldol, around 5 or 6 PM in a suicidal attempt. Poison control recommended observation with repeat EKG at 1:30 AM which was unchanged from previous.   He has had

## 2021-12-17 NOTE — ED QUICK NOTES
This RN was contacted from pts friend Bernardo Abreu. Verbal consent was provided by pt to communicate with Allie regarding pt care.    Bernardo Abreu reports to have test messages from [facility] to her reporting acts of self harm such as     \"im going to the grea

## 2021-12-17 NOTE — ED NOTES
Attempted to meet with pt for assessment. Pt was not easily roused to name being called or touch to the lower extremities. EDRN reported that an EKG was going to be done so the pt would be woken.  This writer met with pt who was marginally awake after the E

## 2021-12-17 NOTE — ED QUICK NOTES
Report received form Jad Finley. Pt now sleeping in bed, resting. No complaints or needs at this time.

## 2021-12-17 NOTE — ED QUICK NOTES
Per EDMD, pt reports ingesting 2mg xanax and 7mg haldol total. [place] Poison Control notified at this time of same. They recommend observation until 2am, and repeat ECG at 0130. Monitor for hypotesnsion/drowsiness/agitation/dystonias.  Case number [id number]

## 2021-12-17 NOTE — ED NOTES
This writer received an incoming phone call from [address] regarding referral packet. Hill Jones confirmed that patients referral packet was received. Patients packet will be reviewed d/t eating disorder.

## 2021-12-17 NOTE — ED QUICK NOTES
Rounded on patient, pt resting comfortably on stretcher with eyes closed. Equal chest rise and fall noted.

## 2021-12-17 NOTE — ED NOTES
This writer contacted [facility] regarding transport time.   This writer notified them that patient will leave the facility at 6:00 pm.

## 2021-12-17 NOTE — ED NOTES
This writer scanned the following into patients chart, listed below. This writer also placed a copy of the second certificate on patients chart.     · Rights of Individuals Receiving Mental Health and Developmental Disability Services  · Application for Vo

## 2021-12-17 NOTE — ED NOTES
This writer received an incoming phone call from [facility] that patients labs were not received. This writer refaxed labs.

## 2021-12-17 NOTE — ED QUICK NOTES
Rounded on pt, asked if he wanted to take morning medications. Pt prefers to sleep at this time. Will try to give morning meds again later.

## 2021-12-17 NOTE — ED PROVIDER NOTES
Patient Seen in: [facility] Emergency Department      History   Patient presents with:  Eval-P    Stated Complaint: eval p    Subjective:   HPI    This is a 78-year-old F to M, history of depression, here for evaluation after attempted suicide.   Ben Pinzon • Uncomfortable fullness after meals    • Visual impairment     glasses   • Vomiting blood    • Wears glasses    • Weight gain               Past Surgical History:   Procedure Laterality Date   • ADENOIDECTOMY     • BENIGN TUMOR EXC > 1.25 CM  back   • R Differential With Platelet.   Procedure                               Abnormality         Status                     ---------                               -----------         ------                     CBC W/ DIFFERENTIAL[[id number]] initial encounter [facility])  (primary encounter diagnosis)  Psychiatric problem     Disposition:  Psychiatric transfer  12/16/2021 10:48 pm    Follow-up:  No follow-up provider specified.         Medications Prescribed:  Current Discharge Medication List

## 2021-12-17 NOTE — ED NOTES
This writer received an incoming phone call from Reno Mead of [address] [place] [address].  Patients packet has been approved psychiatrically solely. Patients packet is still under review for medical acceptance.    [facility] requested a new petit

## 2021-12-17 NOTE — ED NOTES
This writer faxed the following to [facility]:     · EKG with normal sinus rhythm and addendum note form Emergency room physician.

## 2021-12-17 NOTE — ED PROVIDER NOTES
Patient signed out medically cleared awaiting placement for medication overdose as a suicide attempt. Patient was noted to have a slightly low blood pressure during my shift. He did have a similar low blood pressure yesterday evening.   In discussion with

## 2021-12-17 NOTE — ED INITIAL ASSESSMENT (HPI)
Pt arrives by ambulance with a petition from [facility] \"reporting an overdose of unknown amount of xanax and haldol today, 12/16, stating they wouldn't care if they woke up. \" They report taking these at 5-6p. Pt drove themself to [facility] today to be evaluated.  Roxy Reddy

## 2022-01-08 ENCOUNTER — HOSPITAL ENCOUNTER (EMERGENCY)
Age: 27
Discharge: HOME | End: 2022-01-08
Attending: EMERGENCY MEDICINE
Payer: COMMERCIAL

## 2022-01-08 VITALS
OXYGEN SATURATION: 98 % | HEIGHT: 69 IN | SYSTOLIC BLOOD PRESSURE: 148 MMHG | HEART RATE: 112 BPM | RESPIRATION RATE: 20 BRPM | WEIGHT: 183 LBS | TEMPERATURE: 97 F | DIASTOLIC BLOOD PRESSURE: 81 MMHG | BODY MASS INDEX: 27.11 KG/M2

## 2022-01-08 DIAGNOSIS — Z20.822 SUSPECTED COVID-19 VIRUS INFECTION: ICD-10-CM

## 2022-01-08 DIAGNOSIS — J06.9 UPPER RESPIRATORY TRACT INFECTION, UNSPECIFIED TYPE: Primary | ICD-10-CM

## 2022-01-08 LAB — SARS-COV-2 RNA RESP QL NAA+PROBE: NOT DETECTED

## 2022-01-08 PROCEDURE — 99283 EMERGENCY DEPT VISIT LOW MDM: CPT
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
## 2022-01-14 NOTE — ED QUICK NOTES
Zheng Aguero at bedside for update. She made the Pt aware that per [facility] they don't administer hormone therapy. Per report, Pt's last dose of Testosterone subcutaneous inj was last Tue 01/11/22.  Pt responded \"I'll talk to the doctor
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
## 2022-01-14 NOTE — ED PROVIDER NOTES
Patient Seen in: [facility] Emergency Department      History   Patient presents with:  Eval-P    Stated Complaint: eval p    Subjective:   HPI    25-year-old transgender male with a history of anxiety, depression, asthma, idiopathic seizures, eatin age 4 or 3   • Sleep disturbance    • Stomach pain    • Stool incontinence    • Stress    • Syncope    • Uncomfortable fullness after meals    • Visual impairment     glasses   • Vomiting blood    • Wears glasses    • Weight gain               Past [facility] Labs Reviewed   COMP METABOLIC PANEL (14) - Abnormal; Notable for the following components:       Result Value    A/G Ratio 0.9 (*)     All other components within normal limits   ACETAMINOPHEN (TYLENOL), S - Abnormal; Notable for the following compone CBC W/ DIFFERENTIAL[[id number]]                              Final result                 Please view results for these tests on the individual orders.    DRUG CONFIRMATION, AMPHETAMINE UR   CBC W/ DIFFERENTIAL                   MDM      27year-old

## 2022-01-14 NOTE — ED QUICK NOTES
Pt currently still asleep in room, sitter at bedside. Sitter notified that pt will be moving to another room since M pod will be closing.

## 2022-01-14 NOTE — ED INITIAL ASSESSMENT (HPI)
Pt sent from [facility] for med clearance. si with plan.  Calm and cooperative on arrival. Arrives with petition filled out by [facility]

## 2022-01-14 NOTE — ED NOTES
Received signout. 47-year-old with SI. Previously medically cleared.   Pending placement    EKG interpretation by me: EKG sinus rhythm at a rate of 62, axis nomal, no concerning acute ischemic ST changes    Endorsed to a.m. physician, stable, no issues ov

## 2022-01-14 NOTE — ED NOTES
Dr Renny Arboleda accepts pt at [facility] in [place]. Pt to go to Intake rm 100.  Can sent up transport to leave [facility].

## 2022-02-14 ENCOUNTER — HOSPITAL ENCOUNTER (EMERGENCY)
Age: 27
Discharge: HOME | End: 2022-02-14
Attending: EMERGENCY MEDICINE

## 2022-02-14 ENCOUNTER — APPOINTMENT (OUTPATIENT)
Dept: CT IMAGING | Age: 27
End: 2022-02-14
Attending: NURSE PRACTITIONER

## 2022-02-14 VITALS
HEART RATE: 64 BPM | RESPIRATION RATE: 20 BRPM | SYSTOLIC BLOOD PRESSURE: 133 MMHG | OXYGEN SATURATION: 97 % | DIASTOLIC BLOOD PRESSURE: 74 MMHG | WEIGHT: 190 LBS | BODY MASS INDEX: 27.2 KG/M2 | HEIGHT: 70 IN

## 2022-02-14 DIAGNOSIS — S06.0X0A CLOSED HEAD INJURY WITH CONCUSSION, WITHOUT LOSS OF CONSCIOUSNESS, INITIAL ENCOUNTER: Primary | ICD-10-CM

## 2022-02-14 PROCEDURE — 99284 EMERGENCY DEPT VISIT MOD MDM: CPT

## 2022-02-14 PROCEDURE — 70450 CT HEAD/BRAIN W/O DYE: CPT | Performed by: NURSE PRACTITIONER
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
## 2022-02-16 ENCOUNTER — OFFICE VISIT (OUTPATIENT)
Dept: INTERNAL MEDICINE CLINIC | Facility: CLINIC | Age: 27
End: 2022-02-16

## 2022-02-16 VITALS
RESPIRATION RATE: 16 BRPM | HEIGHT: 69 IN | BODY MASS INDEX: 26.81 KG/M2 | WEIGHT: 181 LBS | SYSTOLIC BLOOD PRESSURE: 100 MMHG | DIASTOLIC BLOOD PRESSURE: 70 MMHG | HEART RATE: 98 BPM | OXYGEN SATURATION: 98 %

## 2022-02-16 DIAGNOSIS — E66.3 OVERWEIGHT (BMI 25.0-29.9): ICD-10-CM

## 2022-02-16 DIAGNOSIS — F50.9 EATING DISORDER, UNSPECIFIED TYPE: ICD-10-CM

## 2022-02-16 DIAGNOSIS — F33.2 SEVERE RECURRENT MAJOR DEPRESSION WITHOUT PSYCHOTIC FEATURES (HCC): ICD-10-CM

## 2022-02-16 DIAGNOSIS — Z72.820 LACK OF ADEQUATE SLEEP: ICD-10-CM

## 2022-02-16 DIAGNOSIS — R73.03 PREDIABETES: ICD-10-CM

## 2022-02-16 DIAGNOSIS — K76.0 FATTY LIVER: ICD-10-CM

## 2022-02-16 DIAGNOSIS — Z51.81 THERAPEUTIC DRUG MONITORING: Primary | ICD-10-CM

## 2022-02-16 PROCEDURE — 99213 OFFICE O/P EST LOW 20 MIN: CPT | Performed by: NURSE PRACTITIONER

## 2022-02-16 PROCEDURE — 3008F BODY MASS INDEX DOCD: CPT | Performed by: NURSE PRACTITIONER

## 2022-02-16 PROCEDURE — 3078F DIAST BP <80 MM HG: CPT | Performed by: NURSE PRACTITIONER

## 2022-02-16 PROCEDURE — 3074F SYST BP LT 130 MM HG: CPT | Performed by: NURSE PRACTITIONER

## 2022-02-16 NOTE — PATIENT INSTRUCTIONS
We are here to support you with weight loss, but please remember that you still need your primary care provider for your routine health maintenance. PLAN:  Try to get in more vegs (alternatives given)  Try to keep reducing sugar   Talk with PCP about lack of sleep   Follow up with me in 10 weeks  Schedule follow up appointments: Abril Sainz (dietitian) or Supriya More for insurance coverage for dietitian and labwork prior to scheduling appointment. Please try to work on the following dietary changes:  1. Goals: Aim for 20-30 grams of protein/ meal  i. Aim for 140 grams of carbohydrates/day  ii. Eat 4-6 vegetables/day  iii. Avoid skipping meals- eat every 4-5 hours  iv. Aim for 3 meals/day  2. Drink lots of water and cut down on soda/juice consumption if soda/juice drinker  3. Focus on protein: (15-30 grams with each meal) ie. greek yogurt, cottage cheese, string cheese, hard boiled eggs  4. Healthy snacks: peanut butter and apples, hummus and carrots, berries, nuts (1/4 cup), tuna and crackers                 Protein Shakes: Premier protein or Core Power                Protein Bars: Rx Bars, Oatmega, Power Crunch                 Sargento balanced breaks (cheese and nuts)- without chocolate  5. Reduce carbohydrates which includes sweets as well as rice, pasta, potatoes, bread, corn and instead choose whole grain options or more protein or vegetables (4-6 servings of vegetables per day)  6. Get a good night of sleep  7. Try to decrease stress in life     Please download apps:  1. \"My Fitness Pal\" (other option is Lose it)) to help you to monitor daily dietary intake and you will be able to see if you are eating the right amount of calories, protein, carbs                With My Fitness Pal-->When you set-up the leda or need to adjust settings:                Goals should include:                  Lose 1.5-2 lbs per week                Activity level: not very active (can't count exercise towards calorie number per day)                   ** Daily INPUT> Look at nutrition section-- \"nutrients\" and it will break down your macros for the day (ie. Protein, carbs, fibers, sugars and fats). Try to stay within these numbers daily     2. \"7 minute workout\" to help with exercise/activity which takes 7 minutes of your day and that you can do at home! 3. \"Calm\" or \"Headspace\" which helps with mindfulness, meditation, clarity, sleep, and tyrel to your daily life. 4. [employer] blog for healthy recipe ideas  5. [place] for low carb resources    HIGH PROTEIN SNACK IDEAS  -cottage cheese  -plain yogurt  -kefir  -hard-boiled eggs  -natural cheeses  -nuts (measure portion size)   -unsweetened nut butters  -dried edamame   -bret seeds soaked in water or almond milk  -soy nuts  -cured meats (monitor for sodium issues)   -hummus with vegetables  -bean dip with vegetables     FRUIT  Low carb fruit options   Raspberries: Half a cup (60 grams) contains 3 grams of carbs. Blackberries: Half a cup (70 grams) contains 4 grams of carbs. Strawberries: Half a cup (100 grams) contains 6 grams of carbs. Blueberries: Half a cup (50 grams) contains 6 grams of carbs. Plum: One medium-sized (80 grams) contains 6 grams of carbs.      VEGETABLES  Low carb vegetables

## 2022-03-31 NOTE — ED QUICK NOTES
Pt reports he is a smoker. Instructed that nicorette gum will be ordered. Updated on wait. Colchicine Counseling:  Patient counseled regarding adverse effects including but not limited to stomach upset (nausea, vomiting, stomach pain, or diarrhea).  Patient instructed to limit alcohol consumption while taking this medication.  Colchicine may reduce blood counts especially with prolonged use.  The patient understands that monitoring of kidney function and blood counts may be required, especially at baseline. The patient verbalized understanding of the proper use and possible adverse effects of colchicine.  All of the patient's questions and concerns were addressed.

## 2022-04-27 ENCOUNTER — OFFICE VISIT (OUTPATIENT)
Dept: INTERNAL MEDICINE CLINIC | Facility: CLINIC | Age: 27
End: 2022-04-27

## 2022-04-27 VITALS
RESPIRATION RATE: 16 BRPM | OXYGEN SATURATION: 99 % | WEIGHT: 177 LBS | DIASTOLIC BLOOD PRESSURE: 70 MMHG | BODY MASS INDEX: 26.22 KG/M2 | SYSTOLIC BLOOD PRESSURE: 122 MMHG | HEART RATE: 82 BPM | HEIGHT: 69 IN

## 2022-04-27 DIAGNOSIS — Z51.81 THERAPEUTIC DRUG MONITORING: Primary | ICD-10-CM

## 2022-04-27 DIAGNOSIS — R73.03 PREDIABETES: ICD-10-CM

## 2022-04-27 DIAGNOSIS — K76.0 FATTY LIVER: ICD-10-CM

## 2022-04-27 DIAGNOSIS — F33.2 SEVERE RECURRENT MAJOR DEPRESSION WITHOUT PSYCHOTIC FEATURES (HCC): ICD-10-CM

## 2022-04-27 DIAGNOSIS — E66.3 OVERWEIGHT (BMI 25.0-29.9): ICD-10-CM

## 2022-04-27 DIAGNOSIS — F43.10 PTSD (POST-TRAUMATIC STRESS DISORDER): ICD-10-CM

## 2022-04-27 PROCEDURE — 3008F BODY MASS INDEX DOCD: CPT | Performed by: NURSE PRACTITIONER

## 2022-04-27 PROCEDURE — 3078F DIAST BP <80 MM HG: CPT | Performed by: NURSE PRACTITIONER

## 2022-04-27 PROCEDURE — 99213 OFFICE O/P EST LOW 20 MIN: CPT | Performed by: NURSE PRACTITIONER

## 2022-04-27 PROCEDURE — 3074F SYST BP LT 130 MM HG: CPT | Performed by: NURSE PRACTITIONER

## 2022-04-27 RX ORDER — ALBUTEROL SULFATE 90 UG/1
1-2 AEROSOL, METERED RESPIRATORY (INHALATION) EVERY 4 HOURS PRN
COMMUNITY
Start: 2022-02-20

## 2022-04-27 RX ORDER — HALOPERIDOL 5 MG
2.5 TABLET ORAL 3 TIMES DAILY
COMMUNITY
Start: 2022-03-03

## 2022-04-27 RX ORDER — PAROXETINE HYDROCHLORIDE 20 MG/1
40 TABLET, FILM COATED ORAL NIGHTLY
COMMUNITY
Start: 2022-03-02

## 2022-04-27 NOTE — PATIENT INSTRUCTIONS
We are here to support you with weight loss, but please remember that you still need your primary care provider for your routine health maintenance. PLAN:  Will continue with vyvanse from psych  Follow up with me in 10-12 weeks  Schedule follow up appointments: Eleonora Burleson (dietitian) or Willie Chandler (presurgery dietitian)   Check for insurance coverage for dietitian and labwork prior to scheduling appointment. Please try to work on the following dietary changes:  1. Goals: Aim for 20-30 grams of protein/ meal  i. Aim for 100 grams of carbohydrates/day  ii. Eat 4-6 vegetables/day  iii. Avoid skipping meals- eat every 4-5 hours  iv. Aim for 3 meals/day  2. Drink lots of water and cut down on soda/juice consumption if soda/juice drinker  3. Focus on protein: (15-30 grams with each meal) ie. greek yogurt, cottage cheese, string cheese, hard boiled eggs  4. Healthy snacks: peanut butter and apples, hummus and carrots, berries, nuts (1/4 cup), tuna and crackers                 Protein Shakes: Premier protein or Core Power                Protein Bars: Rx Bars, Oatmega, Power Crunch                 S[place]ento balanced breaks (cheese and nuts)- without chocolate  5. Reduce carbohydrates which includes sweets as well as rice, pasta, potatoes, bread, corn and instead choose whole grain options or more protein or vegetables (4-6 servings of vegetables per day)  6. Get a good night of sleep  7. Try to decrease stress in life     Please download apps:  1. \"My Fitness Pal\" (other option is Lose it)) to help you to monitor daily dietary intake and you will be able to see if you are eating the right amount of calories, protein, carbs                With My Fitness Pal-->When you set-up the leda or need to adjust settings:                Goals should include:                  Lose 1.5-2 lbs per week                Activity level: not very active (can't count exercise towards calorie number per day)                   ** Daily INPUT> Look at nutrition section-- \"nutrients\" and it will break down your macros for the day (ie. Protein, carbs, fibers, sugars and fats). Try to stay within these numbers daily     2. \"7 minute workout\" to help with exercise/activity which takes 7 minutes of your day and that you can do at home! 3. \"Calm\" or \"Headspace\" which helps with mindfulness, meditation, clarity, sleep, and tyrel to your daily life. 4. [employer] blog for healthy recipe ideas  5. [employer] for low carb resources    HIGH PROTEIN SNACK IDEAS  -cottage cheese  -plain yogurt  -kefir  -hard-boiled eggs  -natural cheeses  -nuts (measure portion size)   -unsweetened nut butters  -dried edamame   -bret seeds soaked in water or almond milk  -soy nuts  -cured meats (monitor for sodium issues)   -hummus with vegetables  -bean dip with vegetables     FRUIT  Low carb fruit options   Raspberries: Half a cup (60 grams) contains 3 grams of carbs. Blackberries: Half a cup (70 grams) contains 4 grams of carbs. Strawberries: Half a cup (100 grams) contains 6 grams of carbs. Blueberries: Half a cup (50 grams) contains 6 grams of carbs. Plum: One medium-sized (80 grams) contains 6 grams of carbs.      VEGETABLES  Low carb vegetables

## 2022-06-27 NOTE — ED QUICK NOTES
Pt calm and cooperative with ED staff,   Belongings secured and placed into safety room   Petition completed

## 2022-06-27 NOTE — ED NOTES
Received a call from Joshua Hooper at [facility]. German stating that Patient was accepted under Dr. Frantz Ochoa. THE [facility] Nurse will need to call [phone] to complete RN to RN. Spoke with Nurse to inform of acceptance to [facility]. Provided accepting Doctor. Nurse will call for RN to RN when able.

## 2022-06-27 NOTE — ED NOTES
Spoke with Tee at [facility] to follow-up with referral status. Tee stated that Referral Packet is still being reviewed. [address] and spoke with Landry Elias who stated that Referral Packet has not been reviewed yet.

## 2022-06-27 NOTE — ED NOTES
Spoke with Rochelle Blevins from [facility] who stated that she is still reviewing Packet

## 2022-06-27 NOTE — ED QUICK NOTES
Pt is calm and cooperative at this time. Pt ambulated to the bathroom and gave a urine sample. Pt returned to room with sitter at bedside.

## 2022-06-27 NOTE — ED INITIAL ASSESSMENT (HPI)
Here from [facility] for med clearance and Cert  SI with plan     97.9

## 2022-06-27 NOTE — ED NOTES
Received a call from [place] at [facility] stating that they will not have a bed. [employer] and spoke with Porsha Velasco to follow-up with referral status. Porsha Velasco stated that Referral Packet is still being reviewed. [facility] and made referral over the phone. Referral Packet has been faxed for review. [address] and left voice mail message. Referral Packet has been faxed for review.

## 2022-06-27 NOTE — ED NOTES
Signed              Show:Clear all  []Manual[x]Template[]Copied    Added by:  Diego Schafer RN      []Mk for details    Judyann Merlin 06/27/22 0323   COVID Exposure Risk Screening   Do you have any of the following new or worsening symptoms of COVID-19? None   Have you been diagnosed with COVID-19 within the past 10 days? No   Are you awaiting COVID-19 test results or do you have a COVID-19 test scheduled? No   In the past 10 days, have you been in contact with someone who was confirmed or suspected to have COVID-19?  No

## 2022-06-27 NOTE — ED NOTES
Timothy Beth and spoke with Fiona Thomas to make referral. Informed Fiona Thomas that Patient is requesting [facility]. Referral Packet has been faxed for review. Dean Olson and spoke with Annette Nunes to make referral. Referral Packet has been faxed for review. Will need to fax over UA and UDS will resulted.

## 2022-06-27 NOTE — ED NOTES
[employer] and left a voice mail message    Called [facility] and spoke with Arin to make referral. Referral Packet has been faxed for review.

## 2022-06-27 NOTE — ED NOTES
Show:Clear all  []Manual[x]Template[]Copied    Added by:  Rasheed Cárdenas RN      []Mk for details    Tanya Coker 06/27/22 6881   Level of Care Recommendations   Consulted with [facility] MENTAL [facility]   Level of Care Recommendation Inpatient Acute Care   Unit A1  (EAST)   Reason for Unit Assigned age and dx   Inpatient Criteria Suicidal/homicidal risk   Behavioral Precautions S   Medical Precautions None

## 2022-06-27 NOTE — ED NOTES
Writer checked on pt. Pt is calm and cooperative. Pt agreed to sign in voluntary with writer. Pt declined SI stating her last SI was yesterday. Pt declined wanting to order food at this time and stated he's fine right now. Pt reports to be comfortable and doesn't need anything at this time. Writer will check on pt a little later.

## 2023-02-28 ENCOUNTER — HOSPITAL ENCOUNTER (OUTPATIENT)
Dept: CT IMAGING | Facility: HOSPITAL | Age: 28
Discharge: HOME | End: 2023-02-28
Attending: FAMILY MEDICINE
Payer: COMMERCIAL

## 2023-02-28 DIAGNOSIS — R10.9 UNSPECIFIED ABDOMINAL PAIN: ICD-10-CM

## 2023-02-28 LAB
CREAT BLD-MCNC: 1.1 MG/DL
GFR SERPLBLD BASED ON 1.73 SQ M-ARVRAT: 70 ML/MIN/1.73M2 (ref 60–?)

## 2023-02-28 PROCEDURE — 74177 CT ABD & PELVIS W/CONTRAST: CPT | Performed by: FAMILY MEDICINE

## 2023-02-28 PROCEDURE — 82565 ASSAY OF CREATININE: CPT

## 2023-04-02 PROBLEM — F33.2 MDD (MAJOR DEPRESSIVE DISORDER), RECURRENT EPISODE, SEVERE (HCC): Status: ACTIVE | Noted: 2023-04-02

## 2023-07-20 ENCOUNTER — HOSPITAL ENCOUNTER (EMERGENCY)
Age: 28
Discharge: HOME | End: 2023-07-20
Attending: EMERGENCY MEDICINE
Payer: COMMERCIAL

## 2023-07-20 VITALS
HEART RATE: 84 BPM | RESPIRATION RATE: 18 BRPM | TEMPERATURE: 98 F | BODY MASS INDEX: 27 KG/M2 | DIASTOLIC BLOOD PRESSURE: 75 MMHG | SYSTOLIC BLOOD PRESSURE: 117 MMHG | HEIGHT: 68 IN | OXYGEN SATURATION: 100 %

## 2023-07-20 VITALS
BODY MASS INDEX: 28.14 KG/M2 | OXYGEN SATURATION: 99 % | SYSTOLIC BLOOD PRESSURE: 109 MMHG | HEART RATE: 79 BPM | DIASTOLIC BLOOD PRESSURE: 68 MMHG | WEIGHT: 190 LBS | TEMPERATURE: 97 F | RESPIRATION RATE: 16 BRPM | HEIGHT: 69 IN

## 2023-07-20 DIAGNOSIS — R73.9 HYPERGLYCEMIA: ICD-10-CM

## 2023-07-20 DIAGNOSIS — R55 SYNCOPE, NEAR: Primary | ICD-10-CM

## 2023-07-20 DIAGNOSIS — Z76.89 RETURN TO WORK EVALUATION: Primary | ICD-10-CM

## 2023-07-20 LAB
ALBUMIN SERPL-MCNC: 3.6 G/DL (ref 3.4–5)
ALBUMIN/GLOB SERPL: 1.1 {RATIO} (ref 1–2)
ALP LIVER SERPL-CCNC: 106 U/L
ALT SERPL-CCNC: 68 U/L
ANION GAP SERPL CALC-SCNC: 2 MMOL/L (ref 0–18)
AST SERPL-CCNC: 27 U/L (ref 15–37)
BASOPHILS # BLD AUTO: 0.04 X10(3) UL (ref 0–0.2)
BASOPHILS NFR BLD AUTO: 0.7 %
BILIRUB SERPL-MCNC: 0.8 MG/DL (ref 0.1–2)
BILIRUB UR QL STRIP.AUTO: NEGATIVE
BUN BLD-MCNC: 7 MG/DL (ref 7–18)
CALCIUM BLD-MCNC: 8.6 MG/DL (ref 8.5–10.1)
CHLORIDE SERPL-SCNC: 104 MMOL/L (ref 98–112)
CLARITY UR REFRACT.AUTO: CLEAR
CO2 SERPL-SCNC: 29 MMOL/L (ref 21–32)
COLOR UR AUTO: YELLOW
CREAT BLD-MCNC: 1.2 MG/DL
EGFRCR SERPLBLD CKD-EPI 2021: 63 ML/MIN/1.73M2 (ref 60–?)
EOSINOPHIL # BLD AUTO: 0.08 X10(3) UL (ref 0–0.7)
EOSINOPHIL NFR BLD AUTO: 1.3 %
ERYTHROCYTE [DISTWIDTH] IN BLOOD BY AUTOMATED COUNT: 12.7 %
GLOBULIN PLAS-MCNC: 3.2 G/DL (ref 2.8–4.4)
GLUCOSE BLD-MCNC: 187 MG/DL (ref 70–99)
GLUCOSE BLD-MCNC: 221 MG/DL (ref 70–99)
GLUCOSE UR STRIP.AUTO-MCNC: 500 MG/DL
HCT VFR BLD AUTO: 45.1 %
HGB BLD-MCNC: 15.1 G/DL
IMM GRANULOCYTES # BLD AUTO: 0.01 X10(3) UL (ref 0–1)
IMM GRANULOCYTES NFR BLD: 0.2 %
KETONES UR STRIP.AUTO-MCNC: NEGATIVE MG/DL
LYMPHOCYTES # BLD AUTO: 1.65 X10(3) UL (ref 1–4)
LYMPHOCYTES NFR BLD AUTO: 27.3 %
MAGNESIUM SERPL-MCNC: 2.4 MG/DL (ref 1.6–2.6)
MCH RBC QN AUTO: 31.5 PG (ref 26–34)
MCHC RBC AUTO-ENTMCNC: 33.5 G/DL (ref 31–37)
MCV RBC AUTO: 94 FL
MONOCYTES # BLD AUTO: 0.51 X10(3) UL (ref 0.1–1)
MONOCYTES NFR BLD AUTO: 8.4 %
NEUTROPHILS # BLD AUTO: 3.75 X10 (3) UL (ref 1.5–7.7)
NEUTROPHILS # BLD AUTO: 3.75 X10(3) UL (ref 1.5–7.7)
NEUTROPHILS NFR BLD AUTO: 62.1 %
NITRITE UR QL STRIP.AUTO: NEGATIVE
OSMOLALITY SERPL CALC.SUM OF ELEC: 285 MOSM/KG (ref 275–295)
PH UR STRIP.AUTO: 7 [PH] (ref 5–8)
PLATELET # BLD AUTO: 243 10(3)UL (ref 150–450)
POTASSIUM SERPL-SCNC: 4.1 MMOL/L (ref 3.5–5.1)
PROT SERPL-MCNC: 6.8 G/DL (ref 6.4–8.2)
PROT UR STRIP.AUTO-MCNC: NEGATIVE MG/DL
RBC # BLD AUTO: 4.8 X10(6)UL
RBC UR QL AUTO: NEGATIVE
SODIUM SERPL-SCNC: 135 MMOL/L (ref 136–145)
SP GR UR STRIP.AUTO: 1.02 (ref 1–1.03)
TROPONIN I HIGH SENSITIVITY: <3 NG/L
UROBILINOGEN UR STRIP.AUTO-MCNC: 0.2 MG/DL
WBC # BLD AUTO: 6 X10(3) UL (ref 4–11)

## 2023-07-20 PROCEDURE — 93005 ELECTROCARDIOGRAM TRACING: CPT

## 2023-07-20 PROCEDURE — 83735 ASSAY OF MAGNESIUM: CPT | Performed by: EMERGENCY MEDICINE

## 2023-07-20 PROCEDURE — 80053 COMPREHEN METABOLIC PANEL: CPT | Performed by: EMERGENCY MEDICINE

## 2023-07-20 PROCEDURE — 96361 HYDRATE IV INFUSION ADD-ON: CPT

## 2023-07-20 PROCEDURE — 99212 OFFICE O/P EST SF 10 MIN: CPT

## 2023-07-20 PROCEDURE — 84484 ASSAY OF TROPONIN QUANT: CPT | Performed by: EMERGENCY MEDICINE

## 2023-07-20 PROCEDURE — 96360 HYDRATION IV INFUSION INIT: CPT

## 2023-07-20 PROCEDURE — 81001 URINALYSIS AUTO W/SCOPE: CPT | Performed by: EMERGENCY MEDICINE

## 2023-07-20 PROCEDURE — 83735 ASSAY OF MAGNESIUM: CPT

## 2023-07-20 PROCEDURE — 87086 URINE CULTURE/COLONY COUNT: CPT | Performed by: EMERGENCY MEDICINE

## 2023-07-20 PROCEDURE — 82962 GLUCOSE BLOOD TEST: CPT

## 2023-07-20 PROCEDURE — 81015 MICROSCOPIC EXAM OF URINE: CPT | Performed by: EMERGENCY MEDICINE

## 2023-07-20 PROCEDURE — 85025 COMPLETE CBC W/AUTO DIFF WBC: CPT | Performed by: EMERGENCY MEDICINE

## 2023-07-20 PROCEDURE — 80053 COMPREHEN METABOLIC PANEL: CPT

## 2023-07-20 PROCEDURE — 93010 ELECTROCARDIOGRAM REPORT: CPT

## 2023-07-20 PROCEDURE — 85025 COMPLETE CBC W/AUTO DIFF WBC: CPT

## 2023-07-20 PROCEDURE — 99211 OFF/OP EST MAY X REQ PHY/QHP: CPT

## 2023-07-20 PROCEDURE — 99284 EMERGENCY DEPT VISIT MOD MDM: CPT

## 2023-07-20 RX ORDER — SODIUM CHLORIDE 9 MG/ML
INJECTION, SOLUTION INTRAVENOUS ONCE
Status: COMPLETED | OUTPATIENT
Start: 2023-07-20 | End: 2023-07-20

## 2023-07-20 RX ORDER — CLONAZEPAM 0.5 MG/1
0.5 TABLET ORAL 3 TIMES DAILY
COMMUNITY

## 2023-07-20 NOTE — ED INITIAL ASSESSMENT (HPI)
Patient states he needs clearance to go back to work. Patient states they were out of work due to medical and psychiatric issues and need to have doctor approval to work.

## 2023-07-21 LAB
ATRIAL RATE: 72 BPM
P AXIS: 30 DEGREES
P-R INTERVAL: 128 MS
Q-T INTERVAL: 368 MS
QRS DURATION: 82 MS
QTC CALCULATION (BEZET): 402 MS
R AXIS: 75 DEGREES
T AXIS: 23 DEGREES
VENTRICULAR RATE: 72 BPM

## 2023-07-21 NOTE — ED INITIAL ASSESSMENT (HPI)
Pt states for the past week he has been passing out at work. Denies falling or hitting head. States his job is looking for medical clearance. Pt states he feels dehydrated.

## 2023-10-20 ENCOUNTER — HOSPITAL ENCOUNTER (OUTPATIENT)
Dept: MAMMOGRAPHY | Facility: HOSPITAL | Age: 28
Discharge: HOME | End: 2023-10-20
Attending: FAMILY MEDICINE

## 2023-10-20 DIAGNOSIS — N63.14 LUMP IN LOWER INNER QUADRANT OF RIGHT BREAST: ICD-10-CM

## 2023-10-20 PROCEDURE — 77062 BREAST TOMOSYNTHESIS BI: CPT | Performed by: FAMILY MEDICINE

## 2023-10-20 PROCEDURE — 77066 DX MAMMO INCL CAD BI: CPT | Performed by: FAMILY MEDICINE

## 2023-10-20 PROCEDURE — 76642 ULTRASOUND BREAST LIMITED: CPT | Performed by: FAMILY MEDICINE

## 2023-11-28 ENCOUNTER — APPOINTMENT (OUTPATIENT)
Dept: CT IMAGING | Age: 28
End: 2023-11-28
Attending: NURSE PRACTITIONER
Payer: COMMERCIAL

## 2023-11-28 ENCOUNTER — HOSPITAL ENCOUNTER (EMERGENCY)
Age: 28
Discharge: HOME | End: 2023-11-28
Attending: EMERGENCY MEDICINE
Payer: COMMERCIAL

## 2023-11-28 VITALS
OXYGEN SATURATION: 99 % | SYSTOLIC BLOOD PRESSURE: 131 MMHG | HEART RATE: 81 BPM | WEIGHT: 182 LBS | DIASTOLIC BLOOD PRESSURE: 75 MMHG | HEIGHT: 68 IN | RESPIRATION RATE: 16 BRPM | TEMPERATURE: 100 F | BODY MASS INDEX: 27.58 KG/M2

## 2023-11-28 DIAGNOSIS — R10.9 ABDOMINAL PAIN OF UNKNOWN ETIOLOGY: Primary | ICD-10-CM

## 2023-11-28 LAB
ALBUMIN SERPL-MCNC: 3.9 G/DL (ref 3.4–5)
ALBUMIN/GLOB SERPL: 1 {RATIO} (ref 1–2)
ALP LIVER SERPL-CCNC: 113 U/L
ALT SERPL-CCNC: 42 U/L
ANION GAP SERPL CALC-SCNC: 3 MMOL/L (ref 0–18)
AST SERPL-CCNC: 19 U/L (ref 15–37)
BASOPHILS # BLD AUTO: 0.06 X10(3) UL (ref 0–0.2)
BASOPHILS NFR BLD AUTO: 0.7 %
BILIRUB SERPL-MCNC: 0.4 MG/DL (ref 0.1–2)
BUN BLD-MCNC: 11 MG/DL (ref 9–23)
CALCIUM BLD-MCNC: 8.9 MG/DL (ref 8.5–10.1)
CHLORIDE SERPL-SCNC: 108 MMOL/L (ref 98–112)
CO2 SERPL-SCNC: 28 MMOL/L (ref 21–32)
CREAT BLD-MCNC: 1.12 MG/DL
EGFRCR SERPLBLD CKD-EPI 2021: 68 ML/MIN/1.73M2 (ref 60–?)
EOSINOPHIL # BLD AUTO: 0.09 X10(3) UL (ref 0–0.7)
EOSINOPHIL NFR BLD AUTO: 1 %
ERYTHROCYTE [DISTWIDTH] IN BLOOD BY AUTOMATED COUNT: 12.3 %
GLOBULIN PLAS-MCNC: 4 G/DL (ref 2.8–4.4)
GLUCOSE BLD-MCNC: 67 MG/DL (ref 70–99)
HCT VFR BLD AUTO: 49.3 %
HGB BLD-MCNC: 16.6 G/DL
IMM GRANULOCYTES # BLD AUTO: 0.02 X10(3) UL (ref 0–1)
IMM GRANULOCYTES NFR BLD: 0.2 %
LIPASE SERPL-CCNC: 54 U/L (ref 13–75)
LYMPHOCYTES # BLD AUTO: 2.33 X10(3) UL (ref 1–4)
LYMPHOCYTES NFR BLD AUTO: 26.8 %
MCH RBC QN AUTO: 31.1 PG (ref 26–34)
MCHC RBC AUTO-ENTMCNC: 33.7 G/DL (ref 31–37)
MCV RBC AUTO: 92.3 FL
MONOCYTES # BLD AUTO: 0.93 X10(3) UL (ref 0.1–1)
MONOCYTES NFR BLD AUTO: 10.7 %
NEUTROPHILS # BLD AUTO: 5.26 X10 (3) UL (ref 1.5–7.7)
NEUTROPHILS # BLD AUTO: 5.26 X10(3) UL (ref 1.5–7.7)
NEUTROPHILS NFR BLD AUTO: 60.6 %
OSMOLALITY SERPL CALC.SUM OF ELEC: 286 MOSM/KG (ref 275–295)
PLATELET # BLD AUTO: 313 10(3)UL (ref 150–450)
POTASSIUM SERPL-SCNC: 4.1 MMOL/L (ref 3.5–5.1)
PROT SERPL-MCNC: 7.9 G/DL (ref 6.4–8.2)
RBC # BLD AUTO: 5.34 X10(6)UL
SODIUM SERPL-SCNC: 139 MMOL/L (ref 136–145)
WBC # BLD AUTO: 8.7 X10(3) UL (ref 4–11)

## 2023-11-28 PROCEDURE — 96375 TX/PRO/DX INJ NEW DRUG ADDON: CPT

## 2023-11-28 PROCEDURE — 99284 EMERGENCY DEPT VISIT MOD MDM: CPT

## 2023-11-28 PROCEDURE — 74177 CT ABD & PELVIS W/CONTRAST: CPT | Performed by: NURSE PRACTITIONER

## 2023-11-28 PROCEDURE — 83690 ASSAY OF LIPASE: CPT | Performed by: NURSE PRACTITIONER

## 2023-11-28 PROCEDURE — 96374 THER/PROPH/DIAG INJ IV PUSH: CPT

## 2023-11-28 PROCEDURE — 85025 COMPLETE CBC W/AUTO DIFF WBC: CPT | Performed by: NURSE PRACTITIONER

## 2023-11-28 PROCEDURE — 80053 COMPREHEN METABOLIC PANEL: CPT | Performed by: NURSE PRACTITIONER

## 2023-11-28 PROCEDURE — S0028 INJECTION, FAMOTIDINE, 20 MG: HCPCS | Performed by: NURSE PRACTITIONER

## 2023-11-28 RX ORDER — FAMOTIDINE 10 MG/ML
20 INJECTION, SOLUTION INTRAVENOUS ONCE
Status: COMPLETED | OUTPATIENT
Start: 2023-11-28 | End: 2023-11-28

## 2023-11-28 RX ORDER — DICYCLOMINE HCL 20 MG
20 TABLET ORAL ONCE
Status: COMPLETED | OUTPATIENT
Start: 2023-11-28 | End: 2023-11-28

## 2023-11-28 RX ORDER — FAMOTIDINE 20 MG/1
20 TABLET, FILM COATED ORAL NIGHTLY PRN
Qty: 30 TABLET | Refills: 0 | Status: SHIPPED | OUTPATIENT
Start: 2023-11-28 | End: 2023-12-28

## 2023-11-28 RX ORDER — DICYCLOMINE HCL 20 MG
20 TABLET ORAL 4 TIMES DAILY PRN
Qty: 30 TABLET | Refills: 0 | Status: SHIPPED | OUTPATIENT
Start: 2023-11-28 | End: 2023-12-28

## 2023-11-28 RX ORDER — ONDANSETRON 2 MG/ML
4 INJECTION INTRAMUSCULAR; INTRAVENOUS ONCE
Status: COMPLETED | OUTPATIENT
Start: 2023-11-28 | End: 2023-11-28

## 2023-11-28 RX ORDER — LAMOTRIGINE 200 MG/1
200 TABLET ORAL DAILY
COMMUNITY

## 2023-11-28 RX ORDER — ONDANSETRON 4 MG/1
4 TABLET, ORALLY DISINTEGRATING ORAL EVERY 4 HOURS PRN
Qty: 10 TABLET | Refills: 0 | Status: SHIPPED | OUTPATIENT
Start: 2023-11-28 | End: 2023-12-05

## 2023-11-29 NOTE — ED INITIAL ASSESSMENT (HPI)
Pt reports coming in due to sharp pain to RUQ pain that occasionally radiates to the left side. Pt reports this started a couple weeks ago.

## 2023-11-29 NOTE — DISCHARGE INSTRUCTIONS
Follow-up with your primary care physician for all of your healthcare needs  Increase fluids keep hydrated  Tylenol and Motrin for pain  Also follow-up with GI for further evaluation  Take Zofran for nausea  Use the Bentyl as needed for stomach cramping  Use the Pepcid nightly  Return to the emergency room for symptoms or concerns.

## 2023-12-27 ENCOUNTER — HOSPITAL ENCOUNTER (EMERGENCY)
Facility: HOSPITAL | Age: 28
Discharge: HOME | End: 2023-12-27
Attending: EMERGENCY MEDICINE
Payer: COMMERCIAL

## 2023-12-27 VITALS
DIASTOLIC BLOOD PRESSURE: 68 MMHG | HEIGHT: 68 IN | TEMPERATURE: 98 F | WEIGHT: 182 LBS | SYSTOLIC BLOOD PRESSURE: 118 MMHG | RESPIRATION RATE: 16 BRPM | BODY MASS INDEX: 27.58 KG/M2 | OXYGEN SATURATION: 99 % | HEART RATE: 82 BPM

## 2023-12-27 DIAGNOSIS — Z00.8 ENCOUNTER FOR PSYCHOLOGICAL EVALUATION: Primary | ICD-10-CM

## 2023-12-27 LAB
ALBUMIN SERPL-MCNC: 4.1 G/DL (ref 3.4–5)
ALBUMIN/GLOB SERPL: 1.2 {RATIO} (ref 1–2)
ALP LIVER SERPL-CCNC: 110 U/L
ALT SERPL-CCNC: 34 U/L
ANION GAP SERPL CALC-SCNC: 4 MMOL/L (ref 0–18)
APAP SERPL-MCNC: <2 UG/ML (ref 10–30)
AST SERPL-CCNC: 17 U/L (ref 15–37)
B-HCG UR QL: NEGATIVE
BASOPHILS # BLD AUTO: 0.06 X10(3) UL (ref 0–0.2)
BASOPHILS NFR BLD AUTO: 0.8 %
BENZODIAZ UR QL SCN: NEGATIVE
BILIRUB SERPL-MCNC: 0.5 MG/DL (ref 0.1–2)
BILIRUB UR QL STRIP.AUTO: NEGATIVE
BUN BLD-MCNC: 12 MG/DL (ref 9–23)
CALCIUM BLD-MCNC: 9 MG/DL (ref 8.5–10.1)
CANNABINOIDS UR QL SCN: NEGATIVE
CHLORIDE SERPL-SCNC: 106 MMOL/L (ref 98–112)
CLARITY UR REFRACT.AUTO: CLEAR
CO2 SERPL-SCNC: 30 MMOL/L (ref 21–32)
COCAINE UR QL: NEGATIVE
COLOR UR AUTO: YELLOW
CREAT BLD-MCNC: 1.11 MG/DL
CREAT UR-SCNC: 387 MG/DL
EGFRCR SERPLBLD CKD-EPI 2021: 69 ML/MIN/1.73M2 (ref 60–?)
EOSINOPHIL # BLD AUTO: 0.08 X10(3) UL (ref 0–0.7)
EOSINOPHIL NFR BLD AUTO: 1 %
ERYTHROCYTE [DISTWIDTH] IN BLOOD BY AUTOMATED COUNT: 11.8 %
ETHANOL SERPL-MCNC: <3 MG/DL (ref ?–3)
FLUAV + FLUBV RNA SPEC NAA+PROBE: NEGATIVE
FLUAV + FLUBV RNA SPEC NAA+PROBE: NEGATIVE
GLOBULIN PLAS-MCNC: 3.5 G/DL (ref 2.8–4.4)
GLUCOSE BLD-MCNC: 115 MG/DL (ref 70–99)
GLUCOSE UR STRIP.AUTO-MCNC: NORMAL MG/DL
HCT VFR BLD AUTO: 48.9 %
HGB BLD-MCNC: 16.9 G/DL
IMM GRANULOCYTES # BLD AUTO: 0.02 X10(3) UL (ref 0–1)
IMM GRANULOCYTES NFR BLD: 0.3 %
LEUKOCYTE ESTERASE UR QL STRIP.AUTO: 250
LYMPHOCYTES # BLD AUTO: 1.59 X10(3) UL (ref 1–4)
LYMPHOCYTES NFR BLD AUTO: 20.1 %
MCH RBC QN AUTO: 31.4 PG (ref 26–34)
MCHC RBC AUTO-ENTMCNC: 34.6 G/DL (ref 31–37)
MCV RBC AUTO: 90.9 FL
MDMA UR QL SCN: NEGATIVE
MONOCYTES # BLD AUTO: 0.74 X10(3) UL (ref 0.1–1)
MONOCYTES NFR BLD AUTO: 9.4 %
NEUTROPHILS # BLD AUTO: 5.41 X10 (3) UL (ref 1.5–7.7)
NEUTROPHILS # BLD AUTO: 5.41 X10(3) UL (ref 1.5–7.7)
NEUTROPHILS NFR BLD AUTO: 68.4 %
NITRITE UR QL STRIP.AUTO: NEGATIVE
OPIATES UR QL SCN: NEGATIVE
OSMOLALITY SERPL CALC.SUM OF ELEC: 291 MOSM/KG (ref 275–295)
OXYCODONE UR QL SCN: NEGATIVE
PH UR STRIP.AUTO: 5.5 [PH] (ref 5–8)
PLATELET # BLD AUTO: 280 10(3)UL (ref 150–450)
POTASSIUM SERPL-SCNC: 4.1 MMOL/L (ref 3.5–5.1)
PROT SERPL-MCNC: 7.6 G/DL (ref 6.4–8.2)
PROT UR STRIP.AUTO-MCNC: 30 MG/DL
RBC # BLD AUTO: 5.38 X10(6)UL
RSV RNA SPEC NAA+PROBE: NEGATIVE
SALICYLATES SERPL-MCNC: <1.7 MG/DL (ref 2.8–20)
SARS-COV-2 RNA RESP QL NAA+PROBE: NOT DETECTED
SARS-COV-2 RNA RESP QL NAA+PROBE: NOT DETECTED
SODIUM SERPL-SCNC: 140 MMOL/L (ref 136–145)
SP GR UR STRIP.AUTO: >1.03 (ref 1–1.03)
UROBILINOGEN UR STRIP.AUTO-MCNC: NORMAL MG/DL
WBC # BLD AUTO: 7.9 X10(3) UL (ref 4–11)

## 2023-12-27 PROCEDURE — 99285 EMERGENCY DEPT VISIT HI MDM: CPT

## 2023-12-27 PROCEDURE — 81025 URINE PREGNANCY TEST: CPT

## 2023-12-27 PROCEDURE — 80053 COMPREHEN METABOLIC PANEL: CPT | Performed by: EMERGENCY MEDICINE

## 2023-12-27 PROCEDURE — 82077 ASSAY SPEC XCP UR&BREATH IA: CPT | Performed by: EMERGENCY MEDICINE

## 2023-12-27 PROCEDURE — 90791 PSYCH DIAGNOSTIC EVALUATION: CPT

## 2023-12-27 PROCEDURE — 81001 URINALYSIS AUTO W/SCOPE: CPT | Performed by: EMERGENCY MEDICINE

## 2023-12-27 PROCEDURE — 80143 DRUG ASSAY ACETAMINOPHEN: CPT | Performed by: EMERGENCY MEDICINE

## 2023-12-27 PROCEDURE — 36415 COLL VENOUS BLD VENIPUNCTURE: CPT

## 2023-12-27 PROCEDURE — 85025 COMPLETE CBC W/AUTO DIFF WBC: CPT | Performed by: EMERGENCY MEDICINE

## 2023-12-27 PROCEDURE — 0241U SARS-COV-2/FLU A AND B/RSV BY PCR (GENEXPERT): CPT | Performed by: EMERGENCY MEDICINE

## 2023-12-27 PROCEDURE — 80307 DRUG TEST PRSMV CHEM ANLYZR: CPT | Performed by: EMERGENCY MEDICINE

## 2023-12-27 PROCEDURE — 99284 EMERGENCY DEPT VISIT MOD MDM: CPT

## 2023-12-27 PROCEDURE — 80179 DRUG ASSAY SALICYLATE: CPT | Performed by: EMERGENCY MEDICINE

## 2023-12-27 RX ORDER — ARIPIPRAZOLE 2 MG/1
2 TABLET ORAL DAILY
COMMUNITY
Start: 2023-11-01

## 2023-12-27 RX ORDER — LAMOTRIGINE 200 MG/1
1 TABLET, EXTENDED RELEASE ORAL EVERY MORNING
COMMUNITY
Start: 2023-08-28

## 2023-12-27 RX ORDER — PRAZOSIN HYDROCHLORIDE 2 MG/1
2 CAPSULE ORAL NIGHTLY
COMMUNITY
Start: 2023-11-03

## 2023-12-27 RX ORDER — AMANTADINE HYDROCHLORIDE 100 MG/1
100 TABLET ORAL NIGHTLY
COMMUNITY
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
## 2023-12-27 NOTE — ED QUICK NOTES
Spoke to Mark from [facility], states they are comfortable with patient being discharged after phone assessment pending a safety plan with . Patient resting in ED cart, 1:1 sitter continued at bedside.

## 2023-12-27 NOTE — BH PROGRESS NOTE
Assessment completed and finished call with psychiatrist.     I am working with ER social work to further discuss options for safety planning vs. Inpatient services.

## 2023-12-27 NOTE — ED QUICK NOTES
Spoke with Cari Andrew from [facility] assessing/intake. Per chart and speaking with the physician, no concerns regarding potential discharge with safety plan. At this time, Cari Andrew states he will call the psychiatrist and call back with an update. ER physician made aware.

## 2023-12-27 NOTE — ED INITIAL ASSESSMENT (HPI)
Luis GRIDER Pt denies. [place] [employer] officer ALEXA King ([phone]) presents with an involuntary petition due to suicidal texts. Copies of these texts are with pt's petition.  Pt cooperative with gowning and urine collection

## 2023-12-27 NOTE — ED PROVIDER NOTES
Patient endorsed to me awaiting psychiatric evaluation      Seen and evaluated by [facility], cleared for discharge home with outpatient resources

## 2024-02-17 NOTE — ED INITIAL ASSESSMENT (HPI)
Pt presents to the ED with NFD. pts friends called 911 because pt took 3 600mg gabapentin to \"try and quiet his thoughts\".  Pt denies SI/HI
GENERAL: Well-developed; well-nourished; in no acute distress. AAOx3  SKIN: warm, well perfused  HEAD: 4cm posterior scalp linear laceration, not actively bleeding, does not involve the galea  EYES: PERRLA, no conjunctival erythema, ocular motions intact and appropriate, no nystagmus  ENT: airway clear.  CARD: Regular rate and rhythm.   RESP: b/l breath sounds  ABD: soft, nontender, and nondistended  Upper EXT: Normal ROM. Rad pulses 2+ symm, cap refill <2 secs, sensation intact and symm,  strength 5/5  Lower EXT: no edema, strength 5/5  MSK: no midline spinal tenderness to palpation

## 2024-03-28 ENCOUNTER — APPOINTMENT (OUTPATIENT)
Dept: CT IMAGING | Age: 29
End: 2024-03-28
Attending: EMERGENCY MEDICINE
Payer: COMMERCIAL

## 2024-03-28 ENCOUNTER — HOSPITAL ENCOUNTER (EMERGENCY)
Age: 29
Discharge: LEFT BEFORE DISCHARGE | End: 2024-03-28
Attending: EMERGENCY MEDICINE
Payer: COMMERCIAL

## 2024-03-28 VITALS
WEIGHT: 180 LBS | HEIGHT: 68.5 IN | RESPIRATION RATE: 17 BRPM | DIASTOLIC BLOOD PRESSURE: 57 MMHG | BODY MASS INDEX: 26.97 KG/M2 | SYSTOLIC BLOOD PRESSURE: 108 MMHG | TEMPERATURE: 99 F | OXYGEN SATURATION: 98 % | HEART RATE: 96 BPM

## 2024-03-28 DIAGNOSIS — K12.2 ANGINA, LUDWIG: ICD-10-CM

## 2024-03-28 DIAGNOSIS — M54.12 CERVICAL RADICULOPATHY: ICD-10-CM

## 2024-03-28 DIAGNOSIS — K04.7 DENTAL ABSCESS: Primary | ICD-10-CM

## 2024-03-28 LAB
ALBUMIN SERPL-MCNC: 3.5 G/DL (ref 3.4–5)
ALBUMIN/GLOB SERPL: 1 {RATIO} (ref 1–2)
ALP LIVER SERPL-CCNC: 99 U/L
ALT SERPL-CCNC: 33 U/L
ANION GAP SERPL CALC-SCNC: 7 MMOL/L (ref 0–18)
AST SERPL-CCNC: 18 U/L (ref 15–37)
BASOPHILS # BLD AUTO: 0.04 X10(3) UL (ref 0–0.2)
BASOPHILS NFR BLD AUTO: 0.4 %
BILIRUB SERPL-MCNC: 0.6 MG/DL (ref 0.1–2)
BUN BLD-MCNC: 7 MG/DL (ref 9–23)
CALCIUM BLD-MCNC: 8.6 MG/DL (ref 8.5–10.1)
CHLORIDE SERPL-SCNC: 106 MMOL/L (ref 98–112)
CO2 SERPL-SCNC: 27 MMOL/L (ref 21–32)
CREAT BLD-MCNC: 1.06 MG/DL
EGFRCR SERPLBLD CKD-EPI 2021: 72 ML/MIN/1.73M2 (ref 60–?)
EOSINOPHIL # BLD AUTO: 0.09 X10(3) UL (ref 0–0.7)
EOSINOPHIL NFR BLD AUTO: 0.9 %
ERYTHROCYTE [DISTWIDTH] IN BLOOD BY AUTOMATED COUNT: 12.5 %
GLOBULIN PLAS-MCNC: 3.6 G/DL (ref 2.8–4.4)
GLUCOSE BLD-MCNC: 86 MG/DL (ref 70–99)
HCT VFR BLD AUTO: 43.1 %
HGB BLD-MCNC: 14.8 G/DL
IMM GRANULOCYTES # BLD AUTO: 0.03 X10(3) UL (ref 0–1)
IMM GRANULOCYTES NFR BLD: 0.3 %
LACTATE SERPL-SCNC: 1.6 MMOL/L (ref 0.4–2)
LYMPHOCYTES # BLD AUTO: 1.14 X10(3) UL (ref 1–4)
LYMPHOCYTES NFR BLD AUTO: 12 %
MCH RBC QN AUTO: 32 PG (ref 26–34)
MCHC RBC AUTO-ENTMCNC: 34.3 G/DL (ref 31–37)
MCV RBC AUTO: 93.3 FL
MONOCYTES # BLD AUTO: 0.75 X10(3) UL (ref 0.1–1)
MONOCYTES NFR BLD AUTO: 7.9 %
NEUTROPHILS # BLD AUTO: 7.45 X10 (3) UL (ref 1.5–7.7)
NEUTROPHILS # BLD AUTO: 7.45 X10(3) UL (ref 1.5–7.7)
NEUTROPHILS NFR BLD AUTO: 78.5 %
OSMOLALITY SERPL CALC.SUM OF ELEC: 287 MOSM/KG (ref 275–295)
PLATELET # BLD AUTO: 311 10(3)UL (ref 150–450)
POTASSIUM SERPL-SCNC: 4 MMOL/L (ref 3.5–5.1)
PROT SERPL-MCNC: 7.1 G/DL (ref 6.4–8.2)
RBC # BLD AUTO: 4.62 X10(6)UL
SODIUM SERPL-SCNC: 140 MMOL/L (ref 136–145)
WBC # BLD AUTO: 9.5 X10(3) UL (ref 4–11)

## 2024-03-28 PROCEDURE — 99285 EMERGENCY DEPT VISIT HI MDM: CPT

## 2024-03-28 PROCEDURE — 70487 CT MAXILLOFACIAL W/DYE: CPT | Performed by: EMERGENCY MEDICINE

## 2024-03-28 PROCEDURE — 96365 THER/PROPH/DIAG IV INF INIT: CPT

## 2024-03-28 PROCEDURE — 87040 BLOOD CULTURE FOR BACTERIA: CPT | Performed by: EMERGENCY MEDICINE

## 2024-03-28 PROCEDURE — 80053 COMPREHEN METABOLIC PANEL: CPT | Performed by: EMERGENCY MEDICINE

## 2024-03-28 PROCEDURE — 85025 COMPLETE CBC W/AUTO DIFF WBC: CPT | Performed by: EMERGENCY MEDICINE

## 2024-03-28 PROCEDURE — 99284 EMERGENCY DEPT VISIT MOD MDM: CPT

## 2024-03-28 PROCEDURE — 96375 TX/PRO/DX INJ NEW DRUG ADDON: CPT

## 2024-03-28 PROCEDURE — 36415 COLL VENOUS BLD VENIPUNCTURE: CPT

## 2024-03-28 PROCEDURE — 83605 ASSAY OF LACTIC ACID: CPT | Performed by: EMERGENCY MEDICINE

## 2024-03-28 RX ORDER — AMOXICILLIN AND CLAVULANATE POTASSIUM 875; 125 MG/1; MG/1
1 TABLET, FILM COATED ORAL 2 TIMES DAILY
Qty: 20 TABLET | Refills: 0 | Status: SHIPPED | OUTPATIENT
Start: 2024-03-28 | End: 2024-04-07

## 2024-03-28 RX ORDER — DEXAMETHASONE SODIUM PHOSPHATE 10 MG/ML
10 INJECTION, SOLUTION INTRAMUSCULAR; INTRAVENOUS ONCE
Status: COMPLETED | OUTPATIENT
Start: 2024-03-28 | End: 2024-03-28

## 2024-03-28 RX ORDER — ATOMOXETINE 80 MG/1
80 CAPSULE ORAL DAILY
COMMUNITY

## 2024-03-28 RX ORDER — PIPERACILLIN SODIUM, TAZOBACTAM SODIUM 4; .5 G/20ML; G/20ML
INJECTION, POWDER, LYOPHILIZED, FOR SOLUTION INTRAVENOUS
Status: COMPLETED
Start: 2024-03-28 | End: 2024-03-28

## 2024-03-29 NOTE — DISCHARGE INSTRUCTIONS
Return to the emergency department immediately for reevaluation as soon as you can.  You have potentially life-threatening infection.

## 2024-03-29 NOTE — ED INITIAL ASSESSMENT (HPI)
Right arm pain and swelling since Tuesday. Left side lower dental pain with swelling. Patient describes squeezing pus from site today. Denies fever.
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
## 2024-05-31 ENCOUNTER — HOSPITAL ENCOUNTER (OUTPATIENT)
Age: 29
Discharge: HOME | End: 2024-05-31
Payer: COMMERCIAL

## 2024-05-31 VITALS
HEIGHT: 69 IN | RESPIRATION RATE: 18 BRPM | TEMPERATURE: 97 F | OXYGEN SATURATION: 100 % | BODY MASS INDEX: 27 KG/M2 | SYSTOLIC BLOOD PRESSURE: 116 MMHG | HEART RATE: 75 BPM | DIASTOLIC BLOOD PRESSURE: 72 MMHG

## 2024-05-31 DIAGNOSIS — R11.0 NAUSEA: Primary | ICD-10-CM

## 2024-05-31 DIAGNOSIS — R63.0 ANOREXIA: ICD-10-CM

## 2024-05-31 LAB
#MXD IC: 0.4 X10ˆ3/UL (ref 0.1–1)
B-HCG UR QL: NEGATIVE
BILIRUB UR QL STRIP: NEGATIVE
BUN BLD-MCNC: 10 MG/DL (ref 7–18)
CHLORIDE BLD-SCNC: 105 MMOL/L (ref 98–112)
CO2 BLD-SCNC: 26 MMOL/L (ref 21–32)
COLOR UR: YELLOW
CREAT BLD-MCNC: 1 MG/DL
EGFRCR SERPLBLD CKD-EPI 2021: 78 ML/MIN/1.73M2 (ref 60–?)
GLUCOSE BLD-MCNC: 77 MG/DL (ref 70–99)
GLUCOSE UR STRIP-MCNC: NEGATIVE MG/DL
HCT VFR BLD AUTO: 41.5 %
HCT VFR BLD CALC: 43 %
HGB BLD-MCNC: 14.2 G/DL
HGB UR QL STRIP: NEGATIVE
ISTAT IONIZED CALCIUM FOR CHEM 8: 1.22 MMOL/L (ref 1.12–1.32)
KETONES UR STRIP-MCNC: NEGATIVE MG/DL
LEUKOCYTE ESTERASE UR QL STRIP: NEGATIVE
LYMPHOCYTES # BLD AUTO: 1.6 X10ˆ3/UL (ref 1–4)
LYMPHOCYTES NFR BLD AUTO: 24.3 %
MCH RBC QN AUTO: 31.8 PG (ref 26–34)
MCHC RBC AUTO-ENTMCNC: 34.2 G/DL (ref 31–37)
MCV RBC AUTO: 92.8 FL (ref 80–100)
MIXED CELL %: 6.3 %
NEUTROPHILS # BLD AUTO: 4.5 X10ˆ3/UL (ref 1.5–7.7)
NEUTROPHILS NFR BLD AUTO: 69.4 %
NITRITE UR QL STRIP: NEGATIVE
PH UR STRIP: 5.5 [PH]
PLATELET # BLD AUTO: 282 X10ˆ3/UL (ref 150–450)
POTASSIUM BLD-SCNC: 4.1 MMOL/L (ref 3.6–5.1)
PROT UR STRIP-MCNC: NEGATIVE MG/DL
RBC # BLD AUTO: 4.47 X10ˆ6/UL
SODIUM BLD-SCNC: 139 MMOL/L (ref 136–145)
SP GR UR STRIP: >=1.03
UROBILINOGEN UR STRIP-ACNC: <2 MG/DL
WBC # BLD AUTO: 6.5 X10ˆ3/UL (ref 4–11)

## 2024-05-31 PROCEDURE — 80047 BASIC METABLC PNL IONIZED CA: CPT | Performed by: NURSE PRACTITIONER

## 2024-05-31 PROCEDURE — 81025 URINE PREGNANCY TEST: CPT | Performed by: NURSE PRACTITIONER

## 2024-05-31 PROCEDURE — 99213 OFFICE O/P EST LOW 20 MIN: CPT | Performed by: NURSE PRACTITIONER

## 2024-05-31 PROCEDURE — 96374 THER/PROPH/DIAG INJ IV PUSH: CPT | Performed by: NURSE PRACTITIONER

## 2024-05-31 PROCEDURE — 96361 HYDRATE IV INFUSION ADD-ON: CPT | Performed by: NURSE PRACTITIONER

## 2024-05-31 PROCEDURE — 81002 URINALYSIS NONAUTO W/O SCOPE: CPT | Performed by: NURSE PRACTITIONER

## 2024-05-31 PROCEDURE — 85025 COMPLETE CBC W/AUTO DIFF WBC: CPT | Performed by: NURSE PRACTITIONER

## 2024-05-31 RX ORDER — ONDANSETRON 2 MG/ML
4 INJECTION INTRAMUSCULAR; INTRAVENOUS ONCE
Status: DISCONTINUED | OUTPATIENT
Start: 2024-05-31 | End: 2024-05-31

## 2024-05-31 RX ORDER — METOCLOPRAMIDE HYDROCHLORIDE 5 MG/ML
10 INJECTION INTRAMUSCULAR; INTRAVENOUS ONCE
Status: DISCONTINUED | OUTPATIENT
Start: 2024-05-31 | End: 2024-05-31

## 2024-05-31 RX ORDER — ONDANSETRON 2 MG/ML
4 INJECTION INTRAMUSCULAR; INTRAVENOUS ONCE
Status: COMPLETED | OUTPATIENT
Start: 2024-05-31 | End: 2024-05-31

## 2024-05-31 RX ORDER — SODIUM CHLORIDE 9 MG/ML
1000 INJECTION, SOLUTION INTRAVENOUS ONCE
Status: COMPLETED | OUTPATIENT
Start: 2024-05-31 | End: 2024-05-31

## 2024-05-31 NOTE — DISCHARGE INSTRUCTIONS
Please continue to stay hydrated by drinking plenty of oral fluids.  Follow closely with your primary.  ER if worse.
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
## 2024-05-31 NOTE — ED INITIAL ASSESSMENT (HPI)
Pt c/o nausea, vomiting and decreased appetite.  Pt states he's had symptoms for 7-10 days. Pt denies abdominal pain. Pt states he vomited last yesterday evening. Pt states he's only had coffee and pop today. Pt denies vomiting today.  Pt states he feels lightheaded. Pt feels fatigue.

## 2024-06-18 ENCOUNTER — HOSPITAL ENCOUNTER (EMERGENCY)
Facility: HOSPITAL | Age: 29
Discharge: HOME | End: 2024-06-19
Attending: EMERGENCY MEDICINE

## 2024-06-18 VITALS
DIASTOLIC BLOOD PRESSURE: 94 MMHG | HEART RATE: 88 BPM | OXYGEN SATURATION: 100 % | TEMPERATURE: 98 F | SYSTOLIC BLOOD PRESSURE: 113 MMHG | RESPIRATION RATE: 20 BRPM

## 2024-06-18 DIAGNOSIS — K92.0 HEMATEMESIS WITH NAUSEA: Primary | ICD-10-CM

## 2024-06-18 PROCEDURE — 99284 EMERGENCY DEPT VISIT MOD MDM: CPT

## 2024-06-18 PROCEDURE — 96374 THER/PROPH/DIAG INJ IV PUSH: CPT

## 2024-06-18 PROCEDURE — C9113 INJ PANTOPRAZOLE SODIUM, VIA: HCPCS | Performed by: EMERGENCY MEDICINE

## 2024-06-18 PROCEDURE — 96361 HYDRATE IV INFUSION ADD-ON: CPT

## 2024-06-19 LAB
ALBUMIN SERPL-MCNC: 3.7 G/DL (ref 3.4–5)
ALBUMIN/GLOB SERPL: 1.1 {RATIO} (ref 1–2)
ALP LIVER SERPL-CCNC: 89 U/L
ALT SERPL-CCNC: 28 U/L
ANION GAP SERPL CALC-SCNC: 3 MMOL/L (ref 0–18)
AST SERPL-CCNC: 21 U/L (ref 15–37)
BASOPHILS # BLD AUTO: 0.06 X10(3) UL (ref 0–0.2)
BASOPHILS NFR BLD AUTO: 0.7 %
BILIRUB SERPL-MCNC: 0.9 MG/DL (ref 0.1–2)
BUN BLD-MCNC: 8 MG/DL (ref 9–23)
CALCIUM BLD-MCNC: 8.2 MG/DL (ref 8.5–10.1)
CHLORIDE SERPL-SCNC: 111 MMOL/L (ref 98–112)
CO2 SERPL-SCNC: 24 MMOL/L (ref 21–32)
CREAT BLD-MCNC: 1.23 MG/DL
EGFRCR SERPLBLD CKD-EPI 2021: 61 ML/MIN/1.73M2 (ref 60–?)
EOSINOPHIL # BLD AUTO: 0.05 X10(3) UL (ref 0–0.7)
EOSINOPHIL NFR BLD AUTO: 0.6 %
ERYTHROCYTE [DISTWIDTH] IN BLOOD BY AUTOMATED COUNT: 12.2 %
GLOBULIN PLAS-MCNC: 3.3 G/DL (ref 2.8–4.4)
GLUCOSE BLD-MCNC: 89 MG/DL (ref 70–99)
HCT VFR BLD AUTO: 41.1 %
HGB BLD-MCNC: 14.3 G/DL
IMM GRANULOCYTES # BLD AUTO: 0.03 X10(3) UL (ref 0–1)
IMM GRANULOCYTES NFR BLD: 0.4 %
LYMPHOCYTES # BLD AUTO: 2.01 X10(3) UL (ref 1–4)
LYMPHOCYTES NFR BLD AUTO: 23.7 %
MCH RBC QN AUTO: 32 PG (ref 26–34)
MCHC RBC AUTO-ENTMCNC: 34.8 G/DL (ref 31–37)
MCV RBC AUTO: 91.9 FL
MONOCYTES # BLD AUTO: 0.78 X10(3) UL (ref 0.1–1)
MONOCYTES NFR BLD AUTO: 9.2 %
NEUTROPHILS # BLD AUTO: 5.54 X10 (3) UL (ref 1.5–7.7)
NEUTROPHILS # BLD AUTO: 5.54 X10(3) UL (ref 1.5–7.7)
NEUTROPHILS NFR BLD AUTO: 65.4 %
OSMOLALITY SERPL CALC.SUM OF ELEC: 284 MOSM/KG (ref 275–295)
PLATELET # BLD AUTO: 276 10(3)UL (ref 150–450)
POTASSIUM SERPL-SCNC: 3.8 MMOL/L (ref 3.5–5.1)
PROT SERPL-MCNC: 7 G/DL (ref 6.4–8.2)
RBC # BLD AUTO: 4.47 X10(6)UL
SODIUM SERPL-SCNC: 138 MMOL/L (ref 136–145)
WBC # BLD AUTO: 8.5 X10(3) UL (ref 4–11)

## 2024-06-19 PROCEDURE — 80053 COMPREHEN METABOLIC PANEL: CPT | Performed by: EMERGENCY MEDICINE

## 2024-06-19 PROCEDURE — 85025 COMPLETE CBC W/AUTO DIFF WBC: CPT | Performed by: EMERGENCY MEDICINE
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
## 2024-06-19 NOTE — ED INITIAL ASSESSMENT (HPI)
Pt to ED for bloody emesis for the past few weeks. Pt was talking with  and telling  her about the episodes of emesis. The  then called 911 for the patient. Pt denies that anything is wrong and wants to leave.

## 2024-06-19 NOTE — DISCHARGE INSTRUCTIONS
Follow-up for further evaluation with primary physician and GI as discussed.  Call for an appointment.  Recommend Prilosec daily.  No smoking, no alcohol, no caffeine, no NSAIDs.  Clear liquids, advance diet as tolerated.  Return if new or worse symptoms.

## 2024-06-26 ENCOUNTER — HOSPITAL ENCOUNTER (OUTPATIENT)
Dept: GENERAL RADIOLOGY | Facility: HOSPITAL | Age: 29
Discharge: HOME | End: 2024-06-26
Attending: Other
Payer: COMMERCIAL

## 2024-06-26 ENCOUNTER — HOSPITAL ENCOUNTER (OUTPATIENT)
Dept: GENERAL RADIOLOGY | Age: 29
End: 2024-06-26
Attending: Other
Payer: COMMERCIAL

## 2024-06-26 PROCEDURE — 71045 X-RAY EXAM CHEST 1 VIEW: CPT | Performed by: OTHER

## 2024-07-02 ENCOUNTER — HOSPITAL ENCOUNTER (OUTPATIENT)
Dept: GENERAL RADIOLOGY | Facility: HOSPITAL | Age: 29
Discharge: HOME | End: 2024-07-02
Attending: Other
Payer: COMMERCIAL

## 2024-07-02 PROCEDURE — 71045 X-RAY EXAM CHEST 1 VIEW: CPT | Performed by: OTHER
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
## (undated) DEVICE — TIGERTAIL 5F FLXTIP 70CM

## (undated) DEVICE — TROCAR: Brand: KII® SLEEVE

## (undated) DEVICE — LAP CHOLE/APPY CDS-LF: Brand: MEDLINE INDUSTRIES, INC.

## (undated) DEVICE — TROCAR: Brand: KII SHIELDED BLADED ACCESS SYSTEM

## (undated) DEVICE — INSUFFLATION NEEDLE TO ESTABLISH PNEUMOPERITONEUM.: Brand: INSUFFLATION NEEDLE

## (undated) DEVICE — SCD SLEEVE KNEE HI BLEND

## (undated) DEVICE — STERILE POLYISOPRENE POWDER-FREE SURGICAL GLOVES: Brand: PROTEXIS

## (undated) DEVICE — UNDYED BRAIDED (POLYGLACTIN 910), SYNTHETIC ABSORBABLE SUTURE: Brand: COATED VICRYL

## (undated) DEVICE — DISPOSABLE LAPAROSCOPIC CLIP APPLIER WITH 20 CLIPS.: Brand: EPIX® UNIVERSAL CLIP APPLIER

## (undated) DEVICE — Device

## (undated) DEVICE — ZIPWIRE GUIDE .038X150 STR/STF

## (undated) DEVICE — 40580 - THE PINK PAD - ADVANCED TRENDELENBURG POSITIONING KIT: Brand: 40580 - THE PINK PAD - ADVANCED TRENDELENBURG POSITIONING KIT

## (undated) DEVICE — C-ARM: Brand: UNBRANDED

## (undated) DEVICE — CHLORAPREP 26ML APPLICATOR

## (undated) DEVICE — MONOFILAMENT ABSORBABLE SUTURE: Brand: MAXON

## (undated) DEVICE — LIGHT HANDLE

## (undated) DEVICE — SOL  .9 1000ML BTL

## (undated) NOTE — LETTER
Date & Time: 1/8/2022, 9:37 AM  Patient: Houston López  Encounter Provider(s):    Homa Sandoval MD       To Whom It May Concern:    Sindhucameroncharlie Rosa was seen and treated in our department on 1/8/2022. He should not return to work until 1/11/22.       He n

## (undated) NOTE — LETTER
Date & Time: 3/15/2021, 10:34 AM  Patient: Ana López  Encounter Provider(s):    Carroll Tian MD       To Whom It May Concern:    Bert Lanza was seen and treated in our department on 3/11/2021.  Pt is to follow up with primary care to return t

## (undated) NOTE — LETTER
Mary López YOB: 1995    CONSENT FOR PROCEDURE/SEDATION    1. I authorize the performance upon Mary López  the following: Removal only Nexplanon    2.  I authorize Dr. Chelsea Olvera DO (and whomever is designated as the [employer] Witness: _________________________________________ Date:___________     Physician Signature: _______________________________ Date:___________

## (undated) NOTE — LETTER
Date & Time: 7/20/2023, 11:30 PM  Patient: Geeta López  Encounter Provider(s):    Shira Ruiz MD       To Whom It May Concern:    Nanette Baptiste was seen and treated in our department on 7/20/2023. He can return to work.     If you have any questions or concerns, please do not hesitate to call.        _____________________________  Physician/APC Signature

## (undated) NOTE — LETTER
Date & Time: 12/3/2021, 6:31 PM  Patient: Mike López  Encounter Provider(s):    Willie Sanders MD       To Whom It May Concern:    Lee Ni was seen and treated in our department on 12/3/2021. He may return to work on Sunday 12/6/2021.

## (undated) NOTE — LETTER
2021    Return to Work    Name: Olive Paget        : 1995    To Whom It May Concern,    Olive Paget had surgery on 2021 and is:    Able to return to work on [employer] duty on 2021.   He is to maintain a lifting r

## (undated) NOTE — LETTER
Date & Time: 1/27/2021, 3:14 PM  Patient: Marce López  Encounter Provider(s):    MD Katie Rodríguez PA-C       To Whom It May Concern:    Henrique Quarles was seen and treated in our department on 1/27/2021.   Do not return to w

## (undated) NOTE — LETTER
Date & Time: 12/27/2023, 12:45 PM  Patient: Kana López  Encounter Provider(s):    Eva Ruiz MD Idalia Chiles,        To Whom It May Concern:    Fermin Vásquez was seen and treated in our department on 12/27/2023. He can return to work.     If you have any questions or concerns, please do not hesitate to call.        _____________________________  Physician/APC Signature

## (undated) NOTE — Clinical Note
I had the pleasure of seeing Shayla Warner on 9/1/2021. Please see my attached note.     Akshat Rivera MD FACS  [facility]

## (undated) NOTE — LETTER
Date & Time: 3/3/2021, 8:22 PM  Patient: Cari López  Encounter Provider(s):    RODNEY Olson       To Whom It May Concern:    Crow Holloway was seen and treated in our department on 3/3/2021.  He should not return to work until released by